# Patient Record
Sex: MALE | HISPANIC OR LATINO | URBAN - METROPOLITAN AREA
[De-identification: names, ages, dates, MRNs, and addresses within clinical notes are randomized per-mention and may not be internally consistent; named-entity substitution may affect disease eponyms.]

---

## 2017-07-08 ENCOUNTER — EMERGENCY (EMERGENCY)
Facility: HOSPITAL | Age: 57
LOS: 0 days | Discharge: HOME | End: 2017-07-08
Attending: EMERGENCY MEDICINE | Admitting: EMERGENCY MEDICINE
Payer: OTHER MISCELLANEOUS

## 2017-07-08 VITALS
SYSTOLIC BLOOD PRESSURE: 128 MMHG | TEMPERATURE: 98 F | HEART RATE: 83 BPM | DIASTOLIC BLOOD PRESSURE: 69 MMHG | OXYGEN SATURATION: 97 % | RESPIRATION RATE: 18 BRPM

## 2017-07-08 VITALS — HEIGHT: 67 IN | WEIGHT: 199.96 LBS

## 2017-07-08 DIAGNOSIS — E11.9 TYPE 2 DIABETES MELLITUS WITHOUT COMPLICATIONS: ICD-10-CM

## 2017-07-08 DIAGNOSIS — F41.9 ANXIETY DISORDER, UNSPECIFIED: ICD-10-CM

## 2017-07-08 DIAGNOSIS — S61.219A LACERATION WITHOUT FOREIGN BODY OF UNSPECIFIED FINGER WITHOUT DAMAGE TO NAIL, INITIAL ENCOUNTER: ICD-10-CM
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
## 2017-07-08 DIAGNOSIS — S61.012A LACERATION WITHOUT FOREIGN BODY OF LEFT THUMB WITHOUT DAMAGE TO NAIL, INITIAL ENCOUNTER: ICD-10-CM

## 2017-07-08 PROCEDURE — 73140 X-RAY EXAM OF FINGER(S): CPT | Mod: 26,LT

## 2017-07-08 PROCEDURE — 99284 EMERGENCY DEPT VISIT MOD MDM: CPT

## 2017-07-08 RX ORDER — CEFAZOLIN SODIUM 1 G
1000 VIAL (EA) INJECTION ONCE
Refills: 0 | Status: COMPLETED | OUTPATIENT
Start: 2017-07-08 | End: 2017-07-08

## 2017-07-08 RX ORDER — TETANUS TOXOID, REDUCED DIPHTHERIA TOXOID AND ACELLULAR PERTUSSIS VACCINE, ADSORBED 5; 2.5; 8; 8; 2.5 [IU]/.5ML; [IU]/.5ML; UG/.5ML; UG/.5ML; UG/.5ML
0.5 SUSPENSION INTRAMUSCULAR ONCE
Refills: 0 | Status: COMPLETED | OUTPATIENT
Start: 2017-07-08 | End: 2017-07-08

## 2017-07-08 RX ORDER — CEPHALEXIN 500 MG
1 CAPSULE ORAL
Qty: 28 | Refills: 0
Start: 2017-07-08 | End: 2017-07-15

## 2017-07-08 RX ADMIN — Medication 100 MILLIGRAM(S): at 15:32

## 2017-07-08 RX ADMIN — TETANUS TOXOID, REDUCED DIPHTHERIA TOXOID AND ACELLULAR PERTUSSIS VACCINE, ADSORBED 0.5 MILLILITER(S): 5; 2.5; 8; 8; 2.5 SUSPENSION INTRAMUSCULAR at 15:33

## 2017-07-08 NOTE — ED STATDOCS - ATTENDING CONTRIBUTION TO CARE
I, Maykel Ochoa DO,  performed the initial face to face bedside interview with this patient regarding history of present illness, review of symptoms and relevant past medical, social and family history.  I completed an independent physical examination.  I was the initial provider who evaluated this patient. I have signed out the follow up of any pending tests (i.e. labs, radiological studies) to the ACP.  I have communicated the patient’s plan of care and disposition with the ACP.  The history, relevant review of systems, past medical and surgical history, medical decision making, and physical examination was documented by the scribe in my presence and I attest to the accuracy of the documentation.

## 2017-07-08 NOTE — ED STATDOCS - OBJECTIVE STATEMENT
58 y/o M with no pMHx presents to the ED c/o L thumb pain secondary to cutting finger while at work today. Pt states that he was cutting metal and cut his thumb, currently calm, able to give somewhat adequate hx and denies HA, fever, cough, chills or any other acute c/o at this time.

## 2017-07-08 NOTE — CONSULT NOTE ADULT - SUBJECTIVE AND OBJECTIVE BOX
57M presents to ED c/o of left thumb laceration. Patient was fixing a pool ladder when his hand slipped was cut. Denies numbness, tingling, paresthesias. He denies any other injuries to any other extremity. Has no other complaints.    PMH:  No pertinent past medical history    PSH:  Denies    All: NKDA    Meds: See med rec    Vital Signs Last 24 Hrs  T(C): 36.9 (08 Jul 2017 14:25), Max: 36.9 (08 Jul 2017 14:25)  T(F): 98.4 (08 Jul 2017 14:25), Max: 98.4 (08 Jul 2017 14:25)  HR: 83 (08 Jul 2017 14:25) (83 - 83)  BP: 128/69 (08 Jul 2017 14:25) (128/69 - 128/69)  BP(mean): --  RR: 18 (08 Jul 2017 14:25) (18 - 18)  SpO2: 97% (08 Jul 2017 14:25) (97% - 97%)    Imaging:  XR L Thumb: No fracture    Gen: NAD, A&Ox3  HEENT: NC/AT  LUE:  Left thumb: Laceration through nail and extends distally. Sensation intact throughout thumb. Intact flexion/extension of PIP/DIP joints, compartments soft, +cap refill    Procedure - digital block performed to L thumb with 22G needle and 1% lido without epi via sterile technique. Fingers prepped and draped with betadine. Wound thoroughly  irrigated/debrided. Nail removed to examine nail bed, which was not injured. Wound closed with interrupted 5.0 chromic sutures. Sterile dressings applied. Pt tolerated well. NV status unchanged post-procedure.       Assessment and Recommendation:   · Assessment		  57M with L thumb laceration  -Wounds irrigated/debrided, thumb dressed  -pain control  -keep dressing clean/dry/intact  -rest/elevate  -recc no return to work, and avoidance of any strenuous physical activity  -recc keflex x5 days  -pt instructed to f/u with Dr Hurst this week, call office on Monday to make an appt  -Discussed with Dr Hurst/aware of plan  -pt ortho stable for d/c

## 2017-07-08 NOTE — ED STATDOCS - MEDICAL DECISION MAKING DETAILS
56 y/o M c/o L thumb lac secondary to cutting metal with plans to receive wound care, abx, xray imaging, tetanus for further eval and tx.

## 2017-10-11 ENCOUNTER — EMERGENCY (EMERGENCY)
Facility: HOSPITAL | Age: 57
LOS: 0 days | Discharge: HOME | End: 2017-10-11
Attending: EMERGENCY MEDICINE | Admitting: EMERGENCY MEDICINE
Payer: COMMERCIAL

## 2017-10-11 VITALS — WEIGHT: 229.94 LBS | HEIGHT: 67 IN

## 2017-10-11 VITALS
RESPIRATION RATE: 16 BRPM | HEART RATE: 100 BPM | OXYGEN SATURATION: 98 % | SYSTOLIC BLOOD PRESSURE: 148 MMHG | DIASTOLIC BLOOD PRESSURE: 88 MMHG | TEMPERATURE: 99 F

## 2017-10-11 DIAGNOSIS — R10.30 LOWER ABDOMINAL PAIN, UNSPECIFIED: ICD-10-CM

## 2017-10-11 DIAGNOSIS — N48.1 BALANITIS: ICD-10-CM

## 2017-10-11 LAB — GLUCOSE BLDC GLUCOMTR-MCNC: 366 MG/DL — HIGH (ref 70–99)

## 2017-10-11 PROCEDURE — 99284 EMERGENCY DEPT VISIT MOD MDM: CPT

## 2017-10-11 RX ORDER — CLOTRIMAZOLE AND BETAMETHASONE DIPROPIONATE 10; .5 MG/G; MG/G
1 CREAM TOPICAL ONCE
Refills: 0 | Status: COMPLETED | OUTPATIENT
Start: 2017-10-11 | End: 2017-10-11

## 2017-10-11 RX ORDER — CEPHALEXIN 500 MG
500 CAPSULE ORAL ONCE
Refills: 0 | Status: COMPLETED | OUTPATIENT
Start: 2017-10-11 | End: 2017-10-11

## 2017-10-11 RX ORDER — CEPHALEXIN 500 MG
1 CAPSULE ORAL
Qty: 28 | Refills: 0
Start: 2017-10-11 | End: 2017-10-18

## 2017-10-11 RX ADMIN — Medication 500 MILLIGRAM(S): at 20:02

## 2017-10-11 RX ADMIN — CLOTRIMAZOLE AND BETAMETHASONE DIPROPIONATE 1 APPLICATION(S): 10; .5 CREAM TOPICAL at 20:02

## 2017-10-11 NOTE — ED STATDOCS - SKIN, MLM
(+) Uncircumcised penis, able to retract foreskin, but under foreskin it is red and painful.  skin normal color for race, warm, dry and intact.

## 2017-10-11 NOTE — ED STATDOCS - ATTENDING CONTRIBUTION TO CARE
I, Coy López, performed the initial face to face bedside interview with this patient regarding history of present illness, review of symptoms and relevant past medical, social and family history.  I completed an independent physical examination.  I was the initial provider who evaluated this patient. I have signed out the follow up of any pending tests (i.e. labs, radiological studies) to the ACP.  I have communicated the patient’s plan of care and disposition with the ACP.  The history, relevant review of systems, past medical and surgical history, medical decision making, and physical examination was documented by the scribe in my presence and I attest to the accuracy of the documentation.

## 2017-10-11 NOTE — ED STATDOCS - PROGRESS NOTE DETAILS
pt seen with attending in intake fro pain and reddens to the tip and surrounding penile head for 1 month that has waxed and weaned to more severe  Has not been to PMD denies penile discharge or hx of STD's  tip of penis shows balanitis, redness and swelling to the uncircumcised penis that is easily retracted and showing some white  material patients ; discussed with patient he likely has diabetes; -- patient to follow up with Dr. Barreto tomorrow    -md stefano

## 2017-10-11 NOTE — ED STATDOCS - OBJECTIVE STATEMENT
58 y/o male presents to the ED c/o penis pain.  Uncircumcised, having pain with urination.  Had redness around penis 1 month ago.  He sprayed lemon juice on penis to try to alleviate pain to penis but it caused more pain.  PCP Dr. Barreto.

## 2018-03-09 ENCOUNTER — EMERGENCY (EMERGENCY)
Facility: HOSPITAL | Age: 58
LOS: 0 days | Discharge: HOME | End: 2018-03-09
Attending: EMERGENCY MEDICINE | Admitting: EMERGENCY MEDICINE
Payer: COMMERCIAL

## 2018-03-09 VITALS
OXYGEN SATURATION: 100 % | TEMPERATURE: 98 F | RESPIRATION RATE: 18 BRPM | HEART RATE: 75 BPM | DIASTOLIC BLOOD PRESSURE: 70 MMHG | SYSTOLIC BLOOD PRESSURE: 138 MMHG

## 2018-03-09 VITALS — HEIGHT: 67 IN | WEIGHT: 240.08 LBS

## 2018-03-09 DIAGNOSIS — M65.271 CALCIFIC TENDINITIS, RIGHT ANKLE AND FOOT: ICD-10-CM

## 2018-03-09 DIAGNOSIS — M79.671 PAIN IN RIGHT FOOT: ICD-10-CM

## 2018-03-09 PROCEDURE — 73630 X-RAY EXAM OF FOOT: CPT | Mod: 26,LT

## 2018-03-09 PROCEDURE — 99283 EMERGENCY DEPT VISIT LOW MDM: CPT

## 2018-03-09 PROCEDURE — 73650 X-RAY EXAM OF HEEL: CPT | Mod: 26,RT

## 2018-03-09 RX ORDER — OXYCODONE HYDROCHLORIDE 5 MG/1
1 TABLET ORAL
Qty: 10 | Refills: 0
Start: 2018-03-09

## 2018-03-09 RX ORDER — OXYCODONE HYDROCHLORIDE 5 MG/1
5 TABLET ORAL ONCE
Refills: 0 | Status: DISCONTINUED | OUTPATIENT
Start: 2018-03-09 | End: 2018-03-09

## 2018-03-09 RX ORDER — IBUPROFEN 200 MG
600 TABLET ORAL ONCE
Refills: 0 | Status: COMPLETED | OUTPATIENT
Start: 2018-03-09 | End: 2018-03-09

## 2018-03-09 RX ORDER — IBUPROFEN 200 MG
1 TABLET ORAL
Qty: 15 | Refills: 0
Start: 2018-03-09

## 2018-03-09 RX ADMIN — Medication 600 MILLIGRAM(S): at 21:09

## 2018-03-09 RX ADMIN — OXYCODONE HYDROCHLORIDE 5 MILLIGRAM(S): 5 TABLET ORAL at 23:22

## 2018-03-09 NOTE — ED ADULT TRIAGE NOTE - CHIEF COMPLAINT QUOTE
Pt presents to the ED c/o right foot pain and inflammation from arch to heel area. Pt states he is a  and approximately one month ago began getting a sharp stabbing pain in foot. Today pt tripped over a rolled up rug and states just the sensation of having foot touch area caused immense pain.

## 2018-03-09 NOTE — ED STATDOCS - PROGRESS NOTE DETAILS
signed Yesica Duncan PA-C Pt seen in intake initially by Dr Francois.   Pt c/o right plantar heel pain. +osteophytes and calcific deposits in plantar fascia on xray. Likely source of pts pain. recommend outpt f/u podiatry. rx motrin and oxycodone. WBAT. Pt declines  services. Pt delcines crutches. Pt feeling well, pt and family agree with DC and plan of care.

## 2018-03-09 NOTE — ED STATDOCS - OBJECTIVE STATEMENT
58 y/o male with a PMHx of Type II DM, anxiety presents to the ED c/o right foot pain and inflammation from arch to heel area at 8am this morning. Pt states he is a  and approximately one month ago began getting a sharp stabbing pain in foot. Today pt tripped over a rolled up rug and states just the sensation of having foot touch area caused immense pain. Patient states he has hurt the same foot prior. Last dosage of pain medication was this morning.

## 2018-03-09 NOTE — ED STATDOCS - ATTENDING CONTRIBUTION TO CARE
Attending Contribution to Care: I, Malina Francois, performed the initial face to face bedside interview with this patient regarding history of present illness, review of symptoms and relevant past medical, social and family history.  I completed an independent physical examination.  I was the initial provider who evaluated this patient. I have signed out the follow up of any pending tests (i.e. labs, radiological studies) to the ACP.  I have communicated the patient’s plan of care and disposition with the ACP.

## 2018-03-09 NOTE — ED STATDOCS - MUSCULOSKELETAL, MLM
+ point tenderness to the medial side of the calcaneus of the R foot. range of motion is not limited and there is no muscle tenderness.

## 2018-07-13 ENCOUNTER — APPOINTMENT (OUTPATIENT)
Dept: UROLOGY | Facility: CLINIC | Age: 58
End: 2018-07-13
Payer: COMMERCIAL

## 2018-07-13 VITALS
SYSTOLIC BLOOD PRESSURE: 139 MMHG | OXYGEN SATURATION: 97 % | BODY MASS INDEX: 37.35 KG/M2 | TEMPERATURE: 98 F | HEIGHT: 67 IN | WEIGHT: 238 LBS | HEART RATE: 75 BPM | DIASTOLIC BLOOD PRESSURE: 75 MMHG

## 2018-07-13 PROCEDURE — 99244 OFF/OP CNSLTJ NEW/EST MOD 40: CPT

## 2018-07-13 RX ORDER — CLOTRIMAZOLE AND BETAMETHASONE DIPROPIONATE 10; .5 MG/G; MG/G
1-0.05 CREAM TOPICAL 3 TIMES DAILY
Qty: 15 | Refills: 2 | Status: ACTIVE | COMMUNITY
Start: 2018-07-13 | End: 1900-01-01

## 2018-07-20 ENCOUNTER — APPOINTMENT (OUTPATIENT)
Dept: UROLOGY | Facility: CLINIC | Age: 58
End: 2018-07-20
Payer: COMMERCIAL

## 2018-07-20 PROCEDURE — 99213 OFFICE O/P EST LOW 20 MIN: CPT

## 2018-08-15 ENCOUNTER — OTHER (OUTPATIENT)
Age: 58
End: 2018-08-15

## 2018-08-15 ENCOUNTER — APPOINTMENT (OUTPATIENT)
Dept: UROLOGY | Facility: HOSPITAL | Age: 58
End: 2018-08-15

## 2018-12-14 ENCOUNTER — APPOINTMENT (OUTPATIENT)
Dept: NEUROLOGY | Facility: CLINIC | Age: 58
End: 2018-12-14
Payer: COMMERCIAL

## 2018-12-14 VITALS
BODY MASS INDEX: 37.04 KG/M2 | SYSTOLIC BLOOD PRESSURE: 150 MMHG | HEIGHT: 67 IN | DIASTOLIC BLOOD PRESSURE: 78 MMHG | WEIGHT: 236 LBS

## 2018-12-14 DIAGNOSIS — Z83.3 FAMILY HISTORY OF DIABETES MELLITUS: ICD-10-CM

## 2018-12-14 DIAGNOSIS — Z82.49 FAMILY HISTORY OF ISCHEMIC HEART DISEASE AND OTHER DISEASES OF THE CIRCULATORY SYSTEM: ICD-10-CM

## 2018-12-14 PROCEDURE — 99213 OFFICE O/P EST LOW 20 MIN: CPT

## 2018-12-14 RX ORDER — SITAGLIPTIN AND METFORMIN HYDROCHLORIDE 50; 1000 MG/1; MG/1
50-1000 TABLET, FILM COATED ORAL
Qty: 180 | Refills: 0 | Status: ACTIVE | COMMUNITY
Start: 2018-06-26

## 2018-12-14 RX ORDER — LISINOPRIL 2.5 MG/1
2.5 TABLET ORAL
Qty: 30 | Refills: 0 | Status: ACTIVE | COMMUNITY
Start: 2018-07-10

## 2018-12-14 RX ORDER — INSULIN GLARGINE 100 [IU]/ML
100 INJECTION, SOLUTION SUBCUTANEOUS
Qty: 9 | Refills: 0 | Status: ACTIVE | COMMUNITY
Start: 2018-07-10

## 2019-02-12 ENCOUNTER — APPOINTMENT (OUTPATIENT)
Age: 59
End: 2019-02-12
Payer: COMMERCIAL

## 2019-02-12 VITALS
WEIGHT: 218 LBS | BODY MASS INDEX: 34.21 KG/M2 | HEART RATE: 95 BPM | HEIGHT: 67 IN | SYSTOLIC BLOOD PRESSURE: 163 MMHG | TEMPERATURE: 98.3 F | OXYGEN SATURATION: 95 % | DIASTOLIC BLOOD PRESSURE: 95 MMHG

## 2019-02-12 DIAGNOSIS — N48.1 BALANITIS: ICD-10-CM

## 2019-02-12 PROCEDURE — 99213 OFFICE O/P EST LOW 20 MIN: CPT

## 2019-02-12 NOTE — PHYSICAL EXAM
[General Appearance - Well Developed] : well developed [General Appearance - Well Nourished] : well nourished [Normal Appearance] : normal appearance [Well Groomed] : well groomed [General Appearance - In No Acute Distress] : no acute distress [Abdomen Soft] : soft [Abdomen Tenderness] : non-tender [Costovertebral Angle Tenderness] : no ~M costovertebral angle tenderness [Urethral Meatus] : meatus normal [Urinary Bladder Findings] : the bladder was normal on palpation [Scrotum] : the scrotum was normal [Testes Mass (___cm)] : there were no testicular masses [No Prostate Nodules] : no prostate nodules [FreeTextEntry1] : Phimosis with balanitis, foreskin not retractable [Edema] : no peripheral edema [] : no respiratory distress [Respiration, Rhythm And Depth] : normal respiratory rhythm and effort [Exaggerated Use Of Accessory Muscles For Inspiration] : no accessory muscle use [Oriented To Time, Place, And Person] : oriented to person, place, and time [Affect] : the affect was normal [Mood] : the mood was normal [Not Anxious] : not anxious [Normal Station and Gait] : the gait and station were normal for the patient's age [No Focal Deficits] : no focal deficits [No Palpable Adenopathy] : no palpable adenopathy

## 2019-02-12 NOTE — END OF VISIT
[FreeTextEntry3] : He wishes to have a circumcision and hold the pros cons and possible complications of this procedure were gone over in detail with him. I explained to him that one of my associates may well be doing the procedure based upon timing and he agreed with this. Visually labs were ordered as well he'll followup in one year or as needed

## 2019-02-14 DIAGNOSIS — N47.1 PHIMOSIS: ICD-10-CM

## 2019-02-19 ENCOUNTER — APPOINTMENT (OUTPATIENT)
Dept: UROLOGY | Facility: HOSPITAL | Age: 59
End: 2019-02-19

## 2019-02-19 ENCOUNTER — OTHER (OUTPATIENT)
Age: 59
End: 2019-02-19

## 2019-02-19 DIAGNOSIS — N39.0 URINARY TRACT INFECTION, SITE NOT SPECIFIED: ICD-10-CM

## 2019-02-21 PROBLEM — N39.0 PYURIA: Status: ACTIVE | Noted: 2019-02-21

## 2019-02-21 LAB
APPEARANCE: CLEAR
BACTERIA: NEGATIVE
BILIRUBIN URINE: NEGATIVE
BLOOD URINE: NORMAL
COLOR: YELLOW
GLUCOSE QUALITATIVE U: ABNORMAL
HYALINE CASTS: 4 /LPF
KETONES URINE: NEGATIVE
LEUKOCYTE ESTERASE URINE: NEGATIVE
MICROSCOPIC-UA: NORMAL
NITRITE URINE: NEGATIVE
PH URINE: 6
PROTEIN URINE: ABNORMAL
PSA SERPL-MCNC: 1.01 NG/ML
RED BLOOD CELLS URINE: 4 /HPF
SPECIFIC GRAVITY URINE: 1.04
SQUAMOUS EPITHELIAL CELLS: 4 /HPF
UROBILINOGEN URINE: NORMAL
WHITE BLOOD CELLS URINE: 18 /HPF

## 2019-03-12 ENCOUNTER — OUTPATIENT (OUTPATIENT)
Dept: OUTPATIENT SERVICES | Facility: HOSPITAL | Age: 59
LOS: 1 days | Discharge: HOME | End: 2019-03-12
Payer: COMMERCIAL

## 2019-03-12 VITALS
OXYGEN SATURATION: 96 % | TEMPERATURE: 98 F | SYSTOLIC BLOOD PRESSURE: 136 MMHG | WEIGHT: 221.56 LBS | HEIGHT: 66.5 IN | DIASTOLIC BLOOD PRESSURE: 74 MMHG | HEART RATE: 75 BPM | RESPIRATION RATE: 16 BRPM

## 2019-03-12 DIAGNOSIS — N47.1 PHIMOSIS: ICD-10-CM

## 2019-03-12 DIAGNOSIS — Z01.818 ENCOUNTER FOR OTHER PREPROCEDURAL EXAMINATION: ICD-10-CM

## 2019-03-12 DIAGNOSIS — N48.1 BALANITIS: ICD-10-CM

## 2019-03-12 DIAGNOSIS — Z41.9 ENCOUNTER FOR PROCEDURE FOR PURPOSES OTHER THAN REMEDYING HEALTH STATE, UNSPECIFIED: Chronic | ICD-10-CM

## 2019-03-12 LAB
ANION GAP SERPL CALC-SCNC: 9 MMOL/L — SIGNIFICANT CHANGE UP (ref 5–17)
APPEARANCE UR: CLEAR — SIGNIFICANT CHANGE UP
APTT BLD: 29.4 SEC — SIGNIFICANT CHANGE UP (ref 27.5–36.3)
BACTERIA # UR AUTO: ABNORMAL
BASOPHILS # BLD AUTO: 0.05 K/UL — SIGNIFICANT CHANGE UP (ref 0–0.2)
BASOPHILS NFR BLD AUTO: 0.6 % — SIGNIFICANT CHANGE UP (ref 0–2)
BILIRUB UR-MCNC: NEGATIVE — SIGNIFICANT CHANGE UP
BUN SERPL-MCNC: 13 MG/DL — SIGNIFICANT CHANGE UP (ref 7–23)
CALCIUM SERPL-MCNC: 8.9 MG/DL — SIGNIFICANT CHANGE UP (ref 8.5–10.1)
CHLORIDE SERPL-SCNC: 101 MMOL/L — SIGNIFICANT CHANGE UP (ref 96–108)
CO2 SERPL-SCNC: 27 MMOL/L — SIGNIFICANT CHANGE UP (ref 22–31)
COLOR SPEC: YELLOW — SIGNIFICANT CHANGE UP
CREAT SERPL-MCNC: 1.26 MG/DL — SIGNIFICANT CHANGE UP (ref 0.5–1.3)
DIFF PNL FLD: NEGATIVE — SIGNIFICANT CHANGE UP
EOSINOPHIL # BLD AUTO: 0.12 K/UL — SIGNIFICANT CHANGE UP (ref 0–0.5)
EOSINOPHIL NFR BLD AUTO: 1.5 % — SIGNIFICANT CHANGE UP (ref 0–6)
EPI CELLS # UR: SIGNIFICANT CHANGE UP
GLUCOSE SERPL-MCNC: 399 MG/DL — HIGH (ref 70–99)
GLUCOSE UR QL: 1000 MG/DL
HCT VFR BLD CALC: 44.7 % — SIGNIFICANT CHANGE UP (ref 39–50)
HGB BLD-MCNC: 14.7 G/DL — SIGNIFICANT CHANGE UP (ref 13–17)
IMM GRANULOCYTES NFR BLD AUTO: 0.3 % — SIGNIFICANT CHANGE UP (ref 0–1.5)
INR BLD: 1.03 RATIO — SIGNIFICANT CHANGE UP (ref 0.88–1.16)
KETONES UR-MCNC: NEGATIVE — SIGNIFICANT CHANGE UP
LEUKOCYTE ESTERASE UR-ACNC: NEGATIVE — SIGNIFICANT CHANGE UP
LYMPHOCYTES # BLD AUTO: 2.64 K/UL — SIGNIFICANT CHANGE UP (ref 1–3.3)
LYMPHOCYTES # BLD AUTO: 33.6 % — SIGNIFICANT CHANGE UP (ref 13–44)
MCHC RBC-ENTMCNC: 27.3 PG — SIGNIFICANT CHANGE UP (ref 27–34)
MCHC RBC-ENTMCNC: 32.9 GM/DL — SIGNIFICANT CHANGE UP (ref 32–36)
MCV RBC AUTO: 82.9 FL — SIGNIFICANT CHANGE UP (ref 80–100)
MONOCYTES # BLD AUTO: 0.6 K/UL — SIGNIFICANT CHANGE UP (ref 0–0.9)
MONOCYTES NFR BLD AUTO: 7.6 % — SIGNIFICANT CHANGE UP (ref 2–14)
NEUTROPHILS # BLD AUTO: 4.43 K/UL — SIGNIFICANT CHANGE UP (ref 1.8–7.4)
NEUTROPHILS NFR BLD AUTO: 56.4 % — SIGNIFICANT CHANGE UP (ref 43–77)
NITRITE UR-MCNC: NEGATIVE — SIGNIFICANT CHANGE UP
NRBC # BLD: 0 /100 WBCS — SIGNIFICANT CHANGE UP (ref 0–0)
PH UR: 5 — SIGNIFICANT CHANGE UP (ref 5–8)
PLATELET # BLD AUTO: 210 K/UL — SIGNIFICANT CHANGE UP (ref 150–400)
POTASSIUM SERPL-MCNC: 4.2 MMOL/L — SIGNIFICANT CHANGE UP (ref 3.5–5.3)
POTASSIUM SERPL-SCNC: 4.2 MMOL/L — SIGNIFICANT CHANGE UP (ref 3.5–5.3)
PROT UR-MCNC: 100 MG/DL
PROTHROM AB SERPL-ACNC: 11.4 SEC — SIGNIFICANT CHANGE UP (ref 10–12.9)
RBC # BLD: 5.39 M/UL — SIGNIFICANT CHANGE UP (ref 4.2–5.8)
RBC # FLD: 13.1 % — SIGNIFICANT CHANGE UP (ref 10.3–14.5)
RBC CASTS # UR COMP ASSIST: ABNORMAL /HPF (ref 0–4)
SODIUM SERPL-SCNC: 137 MMOL/L — SIGNIFICANT CHANGE UP (ref 135–145)
SP GR SPEC: 1.01 — SIGNIFICANT CHANGE UP (ref 1.01–1.02)
UROBILINOGEN FLD QL: NEGATIVE MG/DL — SIGNIFICANT CHANGE UP
WBC # BLD: 7.86 K/UL — SIGNIFICANT CHANGE UP (ref 3.8–10.5)
WBC # FLD AUTO: 7.86 K/UL — SIGNIFICANT CHANGE UP (ref 3.8–10.5)
WBC UR QL: SIGNIFICANT CHANGE UP

## 2019-03-12 PROCEDURE — 93010 ELECTROCARDIOGRAM REPORT: CPT

## 2019-03-12 NOTE — H&P PST ADULT - HISTORY OF PRESENT ILLNESS
This is a 59 y/o Thai speaking male with a PMH of diabetes and seizures (last seizure was 9 years ago. Is followed by Dr. Marty Marie (neurologist)). [employer]  [id number] used for this interview. Patient reports he is unable to pull the foreskin of the penis back and he ends up with swelling. He is now scheduled for a circumcision. He denies any dysuria, frequency or nocturia.

## 2019-03-12 NOTE — H&P PST ADULT - ASSESSMENT
This is a 57 y/o Norwegian male with phimosis who is now scheduled for a circumcision    Patient instructed on     1. NPO post midnight of surgery  2. Aware that he needs medical clearance (has an appointment with Dr. Barreto 3/13/19)  3. Aware not to take any medicine on morning of procedure

## 2019-03-12 NOTE — H&P PST ADULT - NSICDXPASTMEDICALHX_GEN_ALL_CORE_FT
PAST MEDICAL HISTORY:  Arthritis B/L knee pain in cold weather    Diabetes     Seizures Last seizure was over 9 years ago

## 2019-03-13 LAB
CULTURE RESULTS: SIGNIFICANT CHANGE UP
SPECIMEN SOURCE: SIGNIFICANT CHANGE UP

## 2019-03-19 ENCOUNTER — APPOINTMENT (OUTPATIENT)
Dept: UROLOGY | Facility: HOSPITAL | Age: 59
End: 2019-03-19

## 2019-06-09 PROBLEM — N47.1 PHIMOSIS: Status: ACTIVE | Noted: 2018-07-13

## 2019-06-19 ENCOUNTER — APPOINTMENT (OUTPATIENT)
Dept: NEUROLOGY | Facility: CLINIC | Age: 59
End: 2019-06-19
Payer: COMMERCIAL

## 2019-06-19 VITALS
HEART RATE: 94 BPM | SYSTOLIC BLOOD PRESSURE: 159 MMHG | HEIGHT: 67 IN | WEIGHT: 225 LBS | BODY MASS INDEX: 35.31 KG/M2 | DIASTOLIC BLOOD PRESSURE: 90 MMHG

## 2019-06-19 PROCEDURE — 99214 OFFICE O/P EST MOD 30 MIN: CPT

## 2019-06-19 NOTE — DISCUSSION/SUMMARY
[Well-controlled] : well-controlled [FreeTextEntry1] : 59-year-old male with history of alcohol abuse, seizure disorder, none for the last 4 years.\par review present treatment.\par Plan: Obtain Keppra serum level.\par Electroencephalograph.\par renew Keppra XR. , 750 mg daily\par RTO 2 months [Generalized] : generalized  [Idiopathic] : idiopathic  [Risk of Death] : Risk of death associated with seizures / SUDEP was discussed. [Risks Associated with Driving/[place] Law] : As per my usual protocol, the patient was advised in regards to risks and driving privileges associated with the [place] Guidelines.  [Compliance with Medications] : The importance of compliance with medications was reinforced.

## 2019-06-19 NOTE — HISTORY OF PRESENT ILLNESS
[FreeTextEntry1] : 59 year old man hx of seizure, continues on Keppra  mg daily. No reported seizures. Last was in 2015. Main causes have been alcohol ingestion, non compliance.\par Previous evaluations have been negative.

## 2019-06-19 NOTE — DATA REVIEWED
[de-identified] : \par COMPARISON: CT head April 9, 2015\par  \par FINDINGS:  \par  \par The ventricles, sulci and basal cisterns appear unremarkable. There is\par no evidence of acute infarct, hemorrhage, or mass lesion.  There is an\par 8mm T2 hyperintensity within the right anterior frontal deep white\par matter and a 7 mm T2 hyperintensity in the left anterior frontal\par subcortical white matter. There is no evidence of abnormal\par enhancement. There is no evidence of midline shift or herniation\par pattern. No mass effect is found in the brain.  \par  \par The vertebral and internal carotid arteries demonstrate expected flow\par voids indicating their patency.  \par  \par The orbits are unremarkable.  The paranasal sinuses are clear.  The\par nasal cavity appears intact.  The nasopharynx is symmetric.  The\par central skull base and petrous temporal bones are intact.  The\par calvarium appears unremarkable.\par  \par  \par IMPRESSION:  No acute infarct, hemorrhage, or mass lesion. No abnormal\par enhancement.   8mm T2 hyperintensity within the right anterior frontal\par deep white matter and a 7 mm T2 hyperintensity in the left anterior\par frontal subcortical white matter, of unknown clinical significance ,\par differential includes foci of traumatic gliosis, chronic microvascular\par ischemic change, demyelination, among other white matter etiologies.

## 2019-06-19 NOTE — PHYSICAL EXAM
[General Appearance - In No Acute Distress] : in no acute distress [General Appearance - Alert] : alert [Time] : oriented to time [Person] : oriented to person [Place] : oriented to place [Concentration Intact] : normal concentrating ability [Visual Intact] : visual attention was ~T not ~L decreased [Writing A Sentence] : no difficulty writing a sentence [Fluency] : fluency intact [Reading] : reading intact [Comprehension] : comprehension intact [Past History] : adequate knowledge of personal past history [Cranial Nerves Optic (II)] : visual acuity intact bilaterally,  visual fields full to confrontation, pupils equal round and reactive to light [Cranial Nerves Oculomotor (III)] : extraocular motion intact [Cranial Nerves Trigeminal (V)] : facial sensation intact symmetrically [Cranial Nerves Vestibulocochlear (VIII)] : hearing was intact bilaterally [Cranial Nerves Glossopharyngeal (IX)] : tongue and palate midline [Cranial Nerves Facial (VII)] : face symmetrical [Motor Tone] : muscle tone was normal in all four extremities [Cranial Nerves Accessory (XI - Cranial And Spinal)] : head turning and shoulder shrug symmetric [Cranial Nerves Hypoglossal (XII)] : there was no tongue deviation with protrusion [Motor Strength] : muscle strength was normal in all four extremities [No Muscle Atrophy] : normal bulk in all four extremities [Balance] : balance was intact [Abnormal Walk] : normal gait [Sensation Tactile Decrease] : light touch was intact [Tremor] : no tremor present [Past-pointing] : there was no past-pointing [Plantar Reflex Right Only] : normal on the right [2+] : Ankle jerk left 2+ [Plantar Reflex Left Only] : normal on the left

## 2019-06-21 ENCOUNTER — APPOINTMENT (OUTPATIENT)
Dept: NEUROLOGY | Facility: CLINIC | Age: 59
End: 2019-06-21
Payer: COMMERCIAL

## 2019-06-21 PROCEDURE — 95819 EEG AWAKE AND ASLEEP: CPT

## 2019-08-02 LAB — LEVETIRACETAM SERPL-MCNC: 9.1 MCG/ML

## 2019-08-19 ENCOUNTER — APPOINTMENT (OUTPATIENT)
Dept: NEUROLOGY | Facility: CLINIC | Age: 59
End: 2019-08-19
Payer: COMMERCIAL

## 2019-08-19 VITALS
DIASTOLIC BLOOD PRESSURE: 74 MMHG | WEIGHT: 222 LBS | HEIGHT: 67 IN | HEART RATE: 81 BPM | SYSTOLIC BLOOD PRESSURE: 149 MMHG | BODY MASS INDEX: 34.84 KG/M2

## 2019-08-19 PROCEDURE — 99213 OFFICE O/P EST LOW 20 MIN: CPT

## 2019-08-19 RX ORDER — LEVETIRACETAM 250 MG/1
250 TABLET, FILM COATED ORAL
Qty: 90 | Refills: 0 | Status: DISCONTINUED | COMMUNITY
Start: 2019-03-27 | End: 2019-08-19

## 2019-08-19 NOTE — HISTORY OF PRESENT ILLNESS
[FreeTextEntry1] : 59-year-old, right-handed man, history of epilepsy, presently on Keppra,  mg daily. Tolerating medication well, no reported seizures. Most recent EEG in June was normal. Seizure control much better since stopped alcohol consumption.\par

## 2019-08-19 NOTE — DATA REVIEWED
[de-identified] : June 21, 2019. Impression: Normal awake and drowsy. [de-identified] : Keppra serum level (8/2019) 9.1

## 2019-08-19 NOTE — PHYSICAL EXAM
[General Appearance - Alert] : alert [General Appearance - In No Acute Distress] : in no acute distress [Affect] : the affect was normal [Oriented To Time, Place, And Person] : oriented to person, place, and time [Impaired Insight] : insight and judgment were intact [Person] : oriented to person [Time] : oriented to time [Concentration Intact] : normal concentrating ability [Place] : oriented to place [Visual Intact] : visual attention was ~T not ~L decreased [Writing A Sentence] : no difficulty writing a sentence [Fluency] : fluency intact [Reading] : reading intact [Comprehension] : comprehension intact [Cranial Nerves Optic (II)] : visual acuity intact bilaterally,  visual fields full to confrontation, pupils equal round and reactive to light [Past History] : adequate knowledge of personal past history [Cranial Nerves Oculomotor (III)] : extraocular motion intact [Cranial Nerves Trigeminal (V)] : facial sensation intact symmetrically [Cranial Nerves Vestibulocochlear (VIII)] : hearing was intact bilaterally [Cranial Nerves Facial (VII)] : face symmetrical [Cranial Nerves Glossopharyngeal (IX)] : tongue and palate midline [Cranial Nerves Accessory (XI - Cranial And Spinal)] : head turning and shoulder shrug symmetric [Cranial Nerves Hypoglossal (XII)] : there was no tongue deviation with protrusion [No Muscle Atrophy] : normal bulk in all four extremities [Motor Tone] : muscle tone was normal in all four extremities [Motor Strength] : muscle strength was normal in all four extremities [Abnormal Walk] : normal gait [Sensation Tactile Decrease] : light touch was intact [Balance] : balance was intact [2+] : Ankle jerk right 2+ [Past-pointing] : there was no past-pointing [Tremor] : no tremor present [Plantar Reflex Right Only] : normal on the right [Plantar Reflex Left Only] : normal on the left

## 2019-08-19 NOTE — DISCUSSION/SUMMARY
[Well-controlled] : well-controlled [Safety Recommendations] : The patient was advised in regards to the risk of seizures and general seizure safety recommendations including not to be bathing alone, climbing to high places and operating heavy machinery. [Compliance with Medications] : The importance of compliance with medications was reinforced. [Medication Side Effects] : High frequency and serious potential medication adverse effects were reviewed with the patient, including but not exclusive to psychiatric effects.  Information sheets on medication side effects were made available to the patient in our clinic.  The patient or advocate agrees to notify us for any concerns. [FreeTextEntry1] : 59-year-old, right-handed male, history of epilepsy, Ling, seizures for the last 4 years. Tolerating Keppra  mg daily. Doing well.\par Plan: continue same dose keppra  mg daily.\par RTO 6 months.\par

## 2019-08-29 NOTE — ED ADULT NURSE NOTE - HARM RISK FACTORS
no 42 y/o F 12 weeks pregnant presents to ED for evaluation. As per patient, today was helping a patient place socks on her feet when she moved her knee. Pt reports that the knee hit her in the abdominal region.  Pt denies having any abdominal pain, fever, vaginal bleeding, vomiting, diarrhea, or other medical complaints. Pt was seen by OB-GYN on Tuesday who confirmed everything was normal with pregnancy (fetal HR was 146 bpm at this time). LMP .   PMH: HTN   PSH: S/P   x2  FH/SH: non-contributory, except as noted in the HPI  Allergies: No known drug allergies  Immunizations: Up-to-date 44 y/o F 12 weeks pregnant (LMP ) presents to ED for evaluation. As per patient, today was helping a patient place socks on her feet when she moved her knee. Pt reports that the knee hit her in the abdominal region.  Pt denies having any abdominal pain, fever, vaginal bleeding, vomiting, diarrhea, or other medical complaints. Pt came to ED for evaluation due to being pregnant but admits to having no acute symptoms.  Pt was seen by her OB-GYN on Tuesday who confirmed IUP and everything was "normal" with pregnancy (fetal HR was 146 bpm at this time). LMP .   PMH: HTN   PSH: S/P   x2  FH/SH: non-contributory, except as noted in the HPI  Allergies: No known drug allergies  Immunizations: Up-to-date

## 2020-01-30 NOTE — HISTORY OF PRESENT ILLNESS
[place] [place] [facility]  [address] [facility]  [address]  [phone] phone     [phone] fax  Hours of Operation: Monday- Friday 8:00am- 5:00pm  After hours phone  [phone]  Hematology / Oncology Physicians on call      [facility] [address] Dr., Dr., Dr., NP Sydney Prescott, NP Tyesha Taylor, NP    Please call with any concerns regarding your appointment today.   [FreeTextEntry1] : This patient presents for evaluation of phimosis which has worsened since last year. He had been scheduled for a circumcision but has family issues and was unable to make the date of the procedure. At this point he is fully unable to retract the skin. He has some occasional frequency and slowing of the stream

## 2020-02-18 ENCOUNTER — APPOINTMENT (OUTPATIENT)
Dept: NEUROLOGY | Facility: CLINIC | Age: 60
End: 2020-02-18

## 2020-03-18 DIAGNOSIS — N40.0 BENIGN PROSTATIC HYPERPLASIA WITHOUT LOWER URINARY TRACT SYMPMS: ICD-10-CM

## 2020-03-31 ENCOUNTER — INPATIENT (INPATIENT)
Facility: HOSPITAL | Age: 60
LOS: 4 days | Discharge: HOME | DRG: 177 | End: 2020-04-05
Attending: FAMILY MEDICINE | Admitting: HOSPITALIST
Payer: COMMERCIAL

## 2020-03-31 VITALS
SYSTOLIC BLOOD PRESSURE: 132 MMHG | OXYGEN SATURATION: 93 % | RESPIRATION RATE: 18 BRPM | HEART RATE: 101 BPM | DIASTOLIC BLOOD PRESSURE: 69 MMHG | HEIGHT: 66 IN | TEMPERATURE: 103 F | WEIGHT: 210.1 LBS

## 2020-03-31 DIAGNOSIS — Z41.9 ENCOUNTER FOR PROCEDURE FOR PURPOSES OTHER THAN REMEDYING HEALTH STATE, UNSPECIFIED: Chronic | ICD-10-CM

## 2020-03-31 LAB
BASOPHILS # BLD AUTO: 0.02 K/UL — SIGNIFICANT CHANGE UP (ref 0–0.2)
BASOPHILS NFR BLD AUTO: 0.3 % — SIGNIFICANT CHANGE UP (ref 0–2)
EOSINOPHIL # BLD AUTO: 0 K/UL — SIGNIFICANT CHANGE UP (ref 0–0.5)
EOSINOPHIL NFR BLD AUTO: 0 % — SIGNIFICANT CHANGE UP (ref 0–6)
HCT VFR BLD CALC: 45.9 % — SIGNIFICANT CHANGE UP (ref 39–50)
HGB BLD-MCNC: 15.6 G/DL — SIGNIFICANT CHANGE UP (ref 13–17)
IMM GRANULOCYTES NFR BLD AUTO: 0.3 % — SIGNIFICANT CHANGE UP (ref 0–1.5)
LYMPHOCYTES # BLD AUTO: 0.91 K/UL — LOW (ref 1–3.3)
LYMPHOCYTES # BLD AUTO: 13.2 % — SIGNIFICANT CHANGE UP (ref 13–44)
MCHC RBC-ENTMCNC: 27 PG — SIGNIFICANT CHANGE UP (ref 27–34)
MCHC RBC-ENTMCNC: 34 GM/DL — SIGNIFICANT CHANGE UP (ref 32–36)
MCV RBC AUTO: 79.4 FL — LOW (ref 80–100)
MONOCYTES # BLD AUTO: 0.59 K/UL — SIGNIFICANT CHANGE UP (ref 0–0.9)
MONOCYTES NFR BLD AUTO: 8.6 % — SIGNIFICANT CHANGE UP (ref 2–14)
NEUTROPHILS # BLD AUTO: 5.36 K/UL — SIGNIFICANT CHANGE UP (ref 1.8–7.4)
NEUTROPHILS NFR BLD AUTO: 77.6 % — HIGH (ref 43–77)
PLATELET # BLD AUTO: 200 K/UL — SIGNIFICANT CHANGE UP (ref 150–400)
RBC # BLD: 5.78 M/UL — SIGNIFICANT CHANGE UP (ref 4.2–5.8)
RBC # FLD: 12.8 % — SIGNIFICANT CHANGE UP (ref 10.3–14.5)
WBC # BLD: 6.9 K/UL — SIGNIFICANT CHANGE UP (ref 3.8–10.5)
WBC # FLD AUTO: 6.9 K/UL — SIGNIFICANT CHANGE UP (ref 3.8–10.5)

## 2020-03-31 PROCEDURE — 93010 ELECTROCARDIOGRAM REPORT: CPT

## 2020-03-31 PROCEDURE — 71045 X-RAY EXAM CHEST 1 VIEW: CPT | Mod: 26

## 2020-03-31 RX ORDER — ACETAMINOPHEN 500 MG
650 TABLET ORAL ONCE
Refills: 0 | Status: COMPLETED | OUTPATIENT
Start: 2020-03-31 | End: 2020-03-31

## 2020-03-31 RX ORDER — SODIUM CHLORIDE 9 MG/ML
2000 INJECTION INTRAMUSCULAR; INTRAVENOUS; SUBCUTANEOUS ONCE
Refills: 0 | Status: COMPLETED | OUTPATIENT
Start: 2020-03-31 | End: 2020-03-31

## 2020-03-31 RX ADMIN — SODIUM CHLORIDE 2000 MILLILITER(S): 9 INJECTION INTRAMUSCULAR; INTRAVENOUS; SUBCUTANEOUS at 23:42

## 2020-03-31 RX ADMIN — Medication 650 MILLIGRAM(S): at 23:42

## 2020-03-31 NOTE — ED ADULT TRIAGE NOTE - BP NONINVASIVE DIASTOLIC (MM HG)
AG Epclusa refill (3 of 6) confirmed and reassessment complete. We will ship AG Epclusa refill on 3/24 via fedex to arrive on 3/25. $0.00 copay- 004. Confirmed 2 patient identifiers - name and . Therapy appropriate.      Patient has 3 doses of AG Epclusa remaining and takes it around 9 AM daily. He is taking medications a the following times:  - Epclusa will be taken WITH BREAKFAST at 9 AM   - Omeprazole 20 mg will be taken 4 hours later at 1 pm (confirmed pick-up from [facility])  - Calcium carbonate will be taken at lunch and dinner      Pt reports they are not having any side effects so far. Patient reported missing 1 dose during this past month. Patient advised of medication adherence importance and decreased effectiveness of medication with missed doses. Patient advised to use phone alarm to aid in medication adherence. Patient verbalized understanding. No new medications, no new allergies or health conditions reported at this time. Allergies reviewed and medication reconciliation complete (reviewed and documented in [facility] and [facility]).  Disease education reviewed (including transmission and prevention). Patient counseled on importance of maintaining adherence and keeping lab appointments which were scheduled. Reviewed importance of returning calls from [facility] for refills. All questions answered and addressed to patients satisfaction. Advised to call [facility] and provider if any issues arise.  Pt verbalized understanding.   69

## 2020-03-31 NOTE — ED PROVIDER NOTE - OBJECTIVE STATEMENT
60 yo male with h/o seizure d/o, DM c/o 7 days of feeling unwell.  +fatigue, fevers/chills, decreased PO intake, sob, coughing.  Last took tylenol at midnight.  +diarrhea x 2 days.  No chest pain, abdominal pain, n/v.  No known sick contacts.  Nonsmoker.  Did not take his diabetes meds today.  Estevan is PMD

## 2020-03-31 NOTE — ED ADULT TRIAGE NOTE - CHIEF COMPLAINT QUOTE
Pt BIBEMS c/o SOB. Pt was seen at Urgent care today and was tested for COVID 19. C/O lethargy, SOB, and fevers x8days  HX DM

## 2020-03-31 NOTE — ED PROVIDER NOTE - CLINICAL SUMMARY MEDICAL DECISION MAKING FREE TEXT BOX
Multiple comlaints/symptoms, likely COVID19.  Will swab for COVID; labs, chest xray and tylenol.  Pt with co-morbidities and Pox low 90s.  Will monitor

## 2020-04-01 DIAGNOSIS — E87.1 HYPO-OSMOLALITY AND HYPONATREMIA: ICD-10-CM

## 2020-04-01 DIAGNOSIS — J12.9 VIRAL PNEUMONIA, UNSPECIFIED: ICD-10-CM

## 2020-04-01 DIAGNOSIS — R56.9 UNSPECIFIED CONVULSIONS: ICD-10-CM

## 2020-04-01 DIAGNOSIS — E11.9 TYPE 2 DIABETES MELLITUS WITHOUT COMPLICATIONS: ICD-10-CM

## 2020-04-01 DIAGNOSIS — N28.9 DISORDER OF KIDNEY AND URETER, UNSPECIFIED: ICD-10-CM

## 2020-04-01 LAB
ALBUMIN SERPL ELPH-MCNC: 2.2 G/DL — LOW (ref 3.3–5)
ALP SERPL-CCNC: 62 U/L — SIGNIFICANT CHANGE UP (ref 40–120)
ALT FLD-CCNC: 31 U/L — SIGNIFICANT CHANGE UP (ref 12–78)
ANION GAP SERPL CALC-SCNC: 10 MMOL/L — SIGNIFICANT CHANGE UP (ref 5–17)
AST SERPL-CCNC: 31 U/L — SIGNIFICANT CHANGE UP (ref 15–37)
BILIRUB SERPL-MCNC: 0.3 MG/DL — SIGNIFICANT CHANGE UP (ref 0.2–1.2)
BUN SERPL-MCNC: 39 MG/DL — HIGH (ref 7–23)
CALCIUM SERPL-MCNC: 8 MG/DL — LOW (ref 8.5–10.1)
CHLORIDE SERPL-SCNC: 97 MMOL/L — SIGNIFICANT CHANGE UP (ref 96–108)
CO2 SERPL-SCNC: 25 MMOL/L — SIGNIFICANT CHANGE UP (ref 22–31)
CREAT SERPL-MCNC: 3.02 MG/DL — HIGH (ref 0.5–1.3)
D DIMER BLD IA.RAPID-MCNC: 461 NG/ML DDU — HIGH
FERRITIN SERPL-MCNC: 213 NG/ML — SIGNIFICANT CHANGE UP (ref 30–400)
GLUCOSE SERPL-MCNC: 178 MG/DL — HIGH (ref 70–99)
LDH SERPL L TO P-CCNC: 375 U/L — HIGH (ref 84–241)
POTASSIUM SERPL-MCNC: 3.6 MMOL/L — SIGNIFICANT CHANGE UP (ref 3.5–5.3)
POTASSIUM SERPL-SCNC: 3.6 MMOL/L — SIGNIFICANT CHANGE UP (ref 3.5–5.3)
PROT SERPL-MCNC: 6.8 GM/DL — SIGNIFICANT CHANGE UP (ref 6–8.3)
SARS-COV-2 RNA SPEC QL NAA+PROBE: DETECTED
SODIUM SERPL-SCNC: 132 MMOL/L — LOW (ref 135–145)
TROPONIN I SERPL-MCNC: <0.015 NG/ML — SIGNIFICANT CHANGE UP (ref 0.01–0.04)

## 2020-04-01 PROCEDURE — 85025 COMPLETE CBC W/AUTO DIFF WBC: CPT

## 2020-04-01 PROCEDURE — 83883 ASSAY NEPHELOMETRY NOT SPEC: CPT

## 2020-04-01 PROCEDURE — 76770 US EXAM ABDO BACK WALL COMP: CPT | Mod: 26

## 2020-04-01 PROCEDURE — 86803 HEPATITIS C AB TEST: CPT

## 2020-04-01 PROCEDURE — 84156 ASSAY OF PROTEIN URINE: CPT

## 2020-04-01 PROCEDURE — 84300 ASSAY OF URINE SODIUM: CPT

## 2020-04-01 PROCEDURE — 84155 ASSAY OF PROTEIN SERUM: CPT

## 2020-04-01 PROCEDURE — 81001 URINALYSIS AUTO W/SCOPE: CPT

## 2020-04-01 PROCEDURE — 83735 ASSAY OF MAGNESIUM: CPT

## 2020-04-01 PROCEDURE — 84145 PROCALCITONIN (PCT): CPT

## 2020-04-01 PROCEDURE — 86334 IMMUNOFIX E-PHORESIS SERUM: CPT

## 2020-04-01 PROCEDURE — 85027 COMPLETE CBC AUTOMATED: CPT

## 2020-04-01 PROCEDURE — 83036 HEMOGLOBIN GLYCOSYLATED A1C: CPT

## 2020-04-01 PROCEDURE — 99223 1ST HOSP IP/OBS HIGH 75: CPT

## 2020-04-01 PROCEDURE — 80069 RENAL FUNCTION PANEL: CPT

## 2020-04-01 PROCEDURE — 84165 PROTEIN E-PHORESIS SERUM: CPT

## 2020-04-01 PROCEDURE — 76770 US EXAM ABDO BACK WALL COMP: CPT

## 2020-04-01 PROCEDURE — 36415 COLL VENOUS BLD VENIPUNCTURE: CPT

## 2020-04-01 PROCEDURE — 80053 COMPREHEN METABOLIC PANEL: CPT

## 2020-04-01 PROCEDURE — 85379 FIBRIN DEGRADATION QUANT: CPT

## 2020-04-01 PROCEDURE — 82570 ASSAY OF URINE CREATININE: CPT

## 2020-04-01 PROCEDURE — 84550 ASSAY OF BLOOD/URIC ACID: CPT

## 2020-04-01 PROCEDURE — 82962 GLUCOSE BLOOD TEST: CPT

## 2020-04-01 PROCEDURE — 83615 LACTATE (LD) (LDH) ENZYME: CPT

## 2020-04-01 PROCEDURE — 86140 C-REACTIVE PROTEIN: CPT

## 2020-04-01 PROCEDURE — 82728 ASSAY OF FERRITIN: CPT

## 2020-04-01 RX ORDER — ACETAMINOPHEN 500 MG
650 TABLET ORAL EVERY 4 HOURS
Refills: 0 | Status: DISCONTINUED | OUTPATIENT
Start: 2020-04-01 | End: 2020-04-05

## 2020-04-01 RX ORDER — HYDROXYCHLOROQUINE SULFATE 200 MG
200 TABLET ORAL EVERY 12 HOURS
Refills: 0 | Status: DISCONTINUED | OUTPATIENT
Start: 2020-04-02 | End: 2020-04-05

## 2020-04-01 RX ORDER — HYDROXYCHLOROQUINE SULFATE 200 MG
TABLET ORAL
Refills: 0 | Status: DISCONTINUED | OUTPATIENT
Start: 2020-04-01 | End: 2020-04-05

## 2020-04-01 RX ORDER — LEVETIRACETAM 250 MG/1
500 TABLET, FILM COATED ORAL ONCE
Refills: 0 | Status: COMPLETED | OUTPATIENT
Start: 2020-04-01 | End: 2020-04-01

## 2020-04-01 RX ORDER — LEVETIRACETAM 250 MG/1
500 TABLET, FILM COATED ORAL
Refills: 0 | Status: DISCONTINUED | OUTPATIENT
Start: 2020-04-01 | End: 2020-04-05

## 2020-04-01 RX ORDER — HYDROXYCHLOROQUINE SULFATE 200 MG
400 TABLET ORAL EVERY 12 HOURS
Refills: 0 | Status: COMPLETED | OUTPATIENT
Start: 2020-04-01 | End: 2020-04-01

## 2020-04-01 RX ORDER — LEVETIRACETAM 250 MG/1
750 TABLET, FILM COATED ORAL ONCE
Refills: 0 | Status: DISCONTINUED | OUTPATIENT
Start: 2020-04-01 | End: 2020-04-01

## 2020-04-01 RX ORDER — SODIUM CHLORIDE 9 MG/ML
1000 INJECTION INTRAMUSCULAR; INTRAVENOUS; SUBCUTANEOUS
Refills: 0 | Status: DISCONTINUED | OUTPATIENT
Start: 2020-04-01 | End: 2020-04-02

## 2020-04-01 RX ADMIN — Medication 650 MILLIGRAM(S): at 20:35

## 2020-04-01 RX ADMIN — Medication 650 MILLIGRAM(S): at 16:01

## 2020-04-01 RX ADMIN — LEVETIRACETAM 500 MILLIGRAM(S): 250 TABLET, FILM COATED ORAL at 09:17

## 2020-04-01 RX ADMIN — SODIUM CHLORIDE 50 MILLILITER(S): 9 INJECTION INTRAMUSCULAR; INTRAVENOUS; SUBCUTANEOUS at 12:47

## 2020-04-01 RX ADMIN — Medication 400 MILLIGRAM(S): at 09:17

## 2020-04-01 RX ADMIN — LEVETIRACETAM 500 MILLIGRAM(S): 250 TABLET, FILM COATED ORAL at 20:48

## 2020-04-01 RX ADMIN — Medication 400 MILLIGRAM(S): at 20:48

## 2020-04-01 RX ADMIN — Medication 650 MILLIGRAM(S): at 09:26

## 2020-04-01 NOTE — ED ADULT NURSE REASSESSMENT NOTE - NS ED NURSE REASSESS COMMENT FT1
Patient A&Ox4, resting in bed. Tylenol administered for fever as prescribed. All other VSS, denies pain/discomfort. Spoke with MD London regarding BGM and insulin sliding scale, awaiting orders. Plan of care discussed with patient. Hand-off report to BOSTON Burks, awaiting transport to . Safety & comfort measures in place, isolation precautions maintained. Purposeful proactive rounding on my time.

## 2020-04-01 NOTE — CONSULT NOTE ADULT - ASSESSMENT
60 yo male w hx of seizures, DM, fatigue, and COVID + , resp distress, fevers and now rising WILLIAMS     WILLIAMS in setting of COVID /pNA     Cr rising    gentle IVF    daily labs   urine lytes    renal sonogram     underlying CKD ? w hx of DM        Viral pneumonia  - plan per Medicine   - started on Plaquenil  - on NC 2 L sating 95%  - CXR  reviewed  - monitor COVID markers    d/w Dr Cesar

## 2020-04-01 NOTE — H&P ADULT - HISTORY OF PRESENT ILLNESS
58 yo male with h/o seizure d/o, DM c/o 7 days of feeling unwell.  +fatigue, fevers/chills, decreased PO intake, sob, coughing.  Last took tylenol at midnight. + diarrhea x 2 days.  No chest pain, abdominal pain, n/v.  No known sick contacts.  Nonsmoker.  Did not take his diabetes meds today.

## 2020-04-01 NOTE — CONSULT NOTE ADULT - SUBJECTIVE AND OBJECTIVE BOX
58 yo male with h/o seizure d/o, DM c/o 7 days of feeling unwell.  +fatigue, fevers/chills, decreased PO intake, sob, coughing.  Last took tylenol at midnight. + diarrhea x 2 days.  No chest pain, abdominal pain, n/v.  No known sick contacts.  Nonsmoker.  Did not take his diabetes meds today.    renal eval called for rising Cr  COVID +     last Creatinine 1.2 in march 2019   today feeling weak, cough   mild sob    no cp         PAST MEDICAL & SURGICAL HISTORY:  Arthritis: B/L knee pain in cold weather  Seizures: Last seizure was over 9 years ago  Diabetes  Elective surgery: Right upper arm injury 1994        Home Medications:  Janumet 50 mg-1000 mg oral tablet: 1 tab(s) orally 2 times a day (12 Mar 2019 16:05)  levETIRAcetam 750 mg oral tablet, extended release: 1 tab(s) orally once a day (at bedtime) (12 Mar 2019 16:05)  Moringa Green Tea: 1 drink orally once a day (12 Mar 2019 16:05)      MEDICATIONS  (STANDING):  hydroxychloroquine 400 milliGRAM(s) Oral every 12 hours  hydroxychloroquine   Oral   levETIRAcetam 500 milliGRAM(s) Oral two times a day  sodium chloride 0.9%. 1000 milliLiter(s) (50 mL/Hr) IV Continuous <Continuous>    MEDICATIONS  (PRN):  acetaminophen   Tablet .. 650 milliGRAM(s) Oral every 4 hours PRN Temp greater or equal to 38.5C (101.3F)        Allergies    iodine containing compounds (Rash; Blisters; Pruritus)    Intolerances        SOCIAL HISTORY:  Denies ETOh,Smoking,     FAMILY HISTORY:      REVIEW OF SYSTEMS:    CONSTITUTIONAL: No weakness, fevers or chills  EYES/ENT: No visual changes;  No vertigo or throat pain   NECK: No pain or stiffness  RESPIRATORY: No cough, wheezing, hemoptysis; No shortness of breath  CARDIOVASCULAR: No chest pain or palpitations  GASTROINTESTINAL: No abdominal or epigastric pain. No nausea, vomiting, or hematemesis; No diarrhea or constipation. No melena or hematochezia.  GENITOURINARY: No dysuria, frequency or hematuria  NEUROLOGICAL: No numbness or weakness  SKIN: No itching, burning, rashes, or lesions   All other review of systems is negative unless indicated above.    VITAL:  Vital Signs Last 24 Hrs  T(C): 38.5 (01 Apr 2020 16:05), Max: 39.3 (31 Mar 2020 21:50)  T(F): 101.3 (01 Apr 2020 16:05), Max: 102.8 (31 Mar 2020 21:50)  HR: 82 (01 Apr 2020 16:05) (79 - 101)  BP: 132/70 (01 Apr 2020 16:05) (121/67 - 139/78)  BP(mean): 96 (01 Apr 2020 03:52) (89 - 96)  RR: 19 (01 Apr 2020 16:05) (16 - 20)  SpO2: 98% (01 Apr 2020 16:05) (93% - 98%)      PHYSICAL EXAM:    Constitutional: NAD  HEENT:  EOMI,  MMM  Neck: No LAD, No JVD  Respiratory: CTAB  Cardiovascular: S1 and S2  Gastrointestinal: BS+, soft, NT/ND  Extremities: No peripheral edema  Neurological: A/O x 3, no focal deficits  : No Gamble  Skin: No rashes  Access: Not applicable    LABS:               133    |  102    |  40     ----------------------------<  169       01 Apr 2020 10:20  3.7     |  21     |  2.90     132    |  97     |  39     ----------------------------<  178       31 Mar 2020 23:33  3.6     |  25     |  3.02     Ca    7.4        01 Apr 2020 10:20  Ca    8.0        31 Mar 2020 23:33    Phos  3.9       01 Apr 2020 10:20      TPro  x      /  Alb  1.9    /  TBili  x      /        01 Apr 2020 10:20  DBili  x      /  AST  x      /  ALT  x      /  AlkPhos  x        TPro  6.8    /  Alb  2.2    /  TBili  0.3    /        31 Mar 2020 23:33  DBili  x      /  AST  31     /  ALT  31     /  AlkPhos  62                             15.6   6.90  )-----------( 200      ( 31 Mar 2020 23:33 )             45.9         Urine Studies:          RADIOLOGY & ADDITIONAL STUDIES:

## 2020-04-01 NOTE — H&P ADULT - PROBLEM SELECTOR PLAN 2
- monitor kidney function  - unknown baseline kidney function - Acute on chronic renal disease, unknown baseline, renal sono suggestive of chronic renal dz  - monitor kidney function  - unknown baseline kidney function

## 2020-04-01 NOTE — H&P ADULT - NSHPPHYSICALEXAM_GEN_ALL_CORE
Physical Exam:   GENERAL APPEARANCE:  NAD, hemodynamically stable  T(C): 37.1 (04-01-20 @ 03:52), Max: 39.3 (03-31-20 @ 21:50)  HR: 79 (04-01-20 @ 03:52) (79 - 101)  BP: 129/80 (04-01-20 @ 03:52) (128/76 - 138/75)  RR: 20 (04-01-20 @ 03:52) (18 - 20)  SpO2: 97% (04-01-20 @ 03:52) (93% - 97%)  Wt(kg): --  HEENT:  Head is normocephalic    Skin:  Warm and dry without any rash   NECK:  Supple without lymphadenopathy.   HEART:  Regular rate and rhythm. normal S1 and S2, No M/R/G  LUNGS:  + ronchi  ABDOMEN:  Soft, nontender, nondistended with good bowel sounds heard  EXTREMITIES:  Without cyanosis, clubbing or edema.   NEUROLOGICAL:  Gross nonfocal

## 2020-04-01 NOTE — H&P ADULT - NSHPLABSRESULTS_GEN_ALL_CORE
CBC Full  -  ( 31 Mar 2020 23:33 )  WBC Count : 6.90 K/uL  RBC Count : 5.78 M/uL  Hemoglobin : 15.6 g/dL  Hematocrit : 45.9 %  Platelet Count - Automated : 200 K/uL  Mean Cell Volume : 79.4 fl  Mean Cell Hemoglobin : 27.0 pg  Mean Cell Hemoglobin Concentration : 34.0 gm/dL  Auto Neutrophil # : 5.36 K/uL  Auto Lymphocyte # : 0.91 K/uL  Auto Monocyte # : 0.59 K/uL  Auto Eosinophil # : 0.00 K/uL  Auto Basophil # : 0.02 K/uL  Auto Neutrophil % : 77.6 %  Auto Lymphocyte % : 13.2 %  Auto Monocyte % : 8.6 %  Auto Eosinophil % : 0.0 %  Auto Basophil % : 0.3 %        03-31    132<L>  |  97  |  39<H>  ----------------------------<  178<H>  3.6   |  25  |  3.02<H>    Ca    8.0<L>      31 Mar 2020 23:33    TPro  6.8  /  Alb  2.2<L>  /  TBili  0.3  /  DBili  x   /  AST  31  /  ALT  31  /  AlkPhos  62  03-31

## 2020-04-01 NOTE — H&P ADULT - PROBLEM SELECTOR PLAN 1
- COVID +  - started on Plaquenil  - on NC 2 L sating 95%  - CXRAY reviewed  - monitor COVID markers

## 2020-04-01 NOTE — ED ADULT NURSE REASSESSMENT NOTE - NS ED NURSE REASSESS COMMENT FT1
Pt moved over from main ED to peds. Vitals taken and stable. Pt satting 98% on 2 liters. Pt oriented to room, call bell light in reach. All needs addressed and safety maintained.

## 2020-04-02 LAB
ALBUMIN SERPL ELPH-MCNC: 1.7 G/DL — LOW (ref 3.3–5)
ALP SERPL-CCNC: 59 U/L — SIGNIFICANT CHANGE UP (ref 40–120)
ALT FLD-CCNC: 25 U/L — SIGNIFICANT CHANGE UP (ref 12–78)
ANION GAP SERPL CALC-SCNC: 10 MMOL/L — SIGNIFICANT CHANGE UP (ref 5–17)
AST SERPL-CCNC: 29 U/L — SIGNIFICANT CHANGE UP (ref 15–37)
BASOPHILS # BLD AUTO: 0.03 K/UL — SIGNIFICANT CHANGE UP (ref 0–0.2)
BASOPHILS NFR BLD AUTO: 0.3 % — SIGNIFICANT CHANGE UP (ref 0–2)
BILIRUB SERPL-MCNC: 0.3 MG/DL — SIGNIFICANT CHANGE UP (ref 0.2–1.2)
BUN SERPL-MCNC: 45 MG/DL — HIGH (ref 7–23)
CALCIUM SERPL-MCNC: 7.6 MG/DL — LOW (ref 8.5–10.1)
CHLORIDE SERPL-SCNC: 102 MMOL/L — SIGNIFICANT CHANGE UP (ref 96–108)
CO2 SERPL-SCNC: 23 MMOL/L — SIGNIFICANT CHANGE UP (ref 22–31)
CREAT SERPL-MCNC: 3.47 MG/DL — HIGH (ref 0.5–1.3)
EOSINOPHIL # BLD AUTO: 0 K/UL — SIGNIFICANT CHANGE UP (ref 0–0.5)
EOSINOPHIL NFR BLD AUTO: 0 % — SIGNIFICANT CHANGE UP (ref 0–6)
GLUCOSE SERPL-MCNC: 161 MG/DL — HIGH (ref 70–99)
HBA1C BLD-MCNC: 9.1 % — HIGH (ref 4–5.6)
HCT VFR BLD CALC: 44.1 % — SIGNIFICANT CHANGE UP (ref 39–50)
HCV AB S/CO SERPL IA: 0.11 S/CO — SIGNIFICANT CHANGE UP (ref 0–0.99)
HCV AB SERPL-IMP: SIGNIFICANT CHANGE UP
HGB BLD-MCNC: 14.8 G/DL — SIGNIFICANT CHANGE UP (ref 13–17)
IMM GRANULOCYTES NFR BLD AUTO: 0.5 % — SIGNIFICANT CHANGE UP (ref 0–1.5)
LYMPHOCYTES # BLD AUTO: 1.44 K/UL — SIGNIFICANT CHANGE UP (ref 1–3.3)
LYMPHOCYTES # BLD AUTO: 14.2 % — SIGNIFICANT CHANGE UP (ref 13–44)
MCHC RBC-ENTMCNC: 27.1 PG — SIGNIFICANT CHANGE UP (ref 27–34)
MCHC RBC-ENTMCNC: 33.6 GM/DL — SIGNIFICANT CHANGE UP (ref 32–36)
MCV RBC AUTO: 80.8 FL — SIGNIFICANT CHANGE UP (ref 80–100)
MONOCYTES # BLD AUTO: 0.38 K/UL — SIGNIFICANT CHANGE UP (ref 0–0.9)
MONOCYTES NFR BLD AUTO: 3.7 % — SIGNIFICANT CHANGE UP (ref 2–14)
NEUTROPHILS # BLD AUTO: 8.27 K/UL — HIGH (ref 1.8–7.4)
NEUTROPHILS NFR BLD AUTO: 81.3 % — HIGH (ref 43–77)
PLATELET # BLD AUTO: 198 K/UL — SIGNIFICANT CHANGE UP (ref 150–400)
POTASSIUM SERPL-MCNC: 3.9 MMOL/L — SIGNIFICANT CHANGE UP (ref 3.5–5.3)
POTASSIUM SERPL-SCNC: 3.9 MMOL/L — SIGNIFICANT CHANGE UP (ref 3.5–5.3)
PROT SERPL-MCNC: 6.1 GM/DL — SIGNIFICANT CHANGE UP (ref 6–8.3)
RBC # BLD: 5.46 M/UL — SIGNIFICANT CHANGE UP (ref 4.2–5.8)
RBC # FLD: 13.2 % — SIGNIFICANT CHANGE UP (ref 10.3–14.5)
SODIUM SERPL-SCNC: 135 MMOL/L — SIGNIFICANT CHANGE UP (ref 135–145)
WBC # BLD: 10.17 K/UL — SIGNIFICANT CHANGE UP (ref 3.8–10.5)
WBC # FLD AUTO: 10.17 K/UL — SIGNIFICANT CHANGE UP (ref 3.8–10.5)

## 2020-04-02 PROCEDURE — 99233 SBSQ HOSP IP/OBS HIGH 50: CPT

## 2020-04-02 RX ORDER — GLUCAGON INJECTION, SOLUTION 0.5 MG/.1ML
1 INJECTION, SOLUTION SUBCUTANEOUS ONCE
Refills: 0 | Status: DISCONTINUED | OUTPATIENT
Start: 2020-04-02 | End: 2020-04-05

## 2020-04-02 RX ORDER — SODIUM CHLORIDE 9 MG/ML
1000 INJECTION, SOLUTION INTRAVENOUS
Refills: 0 | Status: DISCONTINUED | OUTPATIENT
Start: 2020-04-02 | End: 2020-04-03

## 2020-04-02 RX ORDER — DEXTROSE 50 % IN WATER 50 %
12.5 SYRINGE (ML) INTRAVENOUS ONCE
Refills: 0 | Status: DISCONTINUED | OUTPATIENT
Start: 2020-04-02 | End: 2020-04-05

## 2020-04-02 RX ORDER — SODIUM CHLORIDE 9 MG/ML
1000 INJECTION, SOLUTION INTRAVENOUS
Refills: 0 | Status: DISCONTINUED | OUTPATIENT
Start: 2020-04-02 | End: 2020-04-05

## 2020-04-02 RX ORDER — INSULIN LISPRO 100/ML
VIAL (ML) SUBCUTANEOUS AT BEDTIME
Refills: 0 | Status: DISCONTINUED | OUTPATIENT
Start: 2020-04-02 | End: 2020-04-05

## 2020-04-02 RX ORDER — DEXTROSE 50 % IN WATER 50 %
25 SYRINGE (ML) INTRAVENOUS ONCE
Refills: 0 | Status: DISCONTINUED | OUTPATIENT
Start: 2020-04-02 | End: 2020-04-05

## 2020-04-02 RX ORDER — DEXTROSE 50 % IN WATER 50 %
15 SYRINGE (ML) INTRAVENOUS ONCE
Refills: 0 | Status: DISCONTINUED | OUTPATIENT
Start: 2020-04-02 | End: 2020-04-05

## 2020-04-02 RX ADMIN — LEVETIRACETAM 500 MILLIGRAM(S): 250 TABLET, FILM COATED ORAL at 10:30

## 2020-04-02 RX ADMIN — Medication 650 MILLIGRAM(S): at 22:00

## 2020-04-02 RX ADMIN — Medication 80 MILLIGRAM(S): at 22:00

## 2020-04-02 RX ADMIN — LEVETIRACETAM 500 MILLIGRAM(S): 250 TABLET, FILM COATED ORAL at 22:00

## 2020-04-02 RX ADMIN — SODIUM CHLORIDE 75 MILLILITER(S): 9 INJECTION, SOLUTION INTRAVENOUS at 18:43

## 2020-04-02 RX ADMIN — Medication 200 MILLIGRAM(S): at 10:30

## 2020-04-02 RX ADMIN — Medication 200 MILLIGRAM(S): at 21:59

## 2020-04-02 RX ADMIN — Medication 650 MILLIGRAM(S): at 10:30

## 2020-04-02 NOTE — PROGRESS NOTE ADULT - SUBJECTIVE AND OBJECTIVE BOX
60 yo male with h/o seizure d/o, DM c/o 7 days of feeling unwell.  +fatigue, fevers/chills, decreased PO intake, sob, coughing.  Last took tylenol at midnight. + diarrhea x 2 days.  No chest pain, abdominal pain, n/v.  No known sick contacts.  Nonsmoker.  Did not take his diabetes meds today.    renal eval called for rising Cr  COVID +     last Creatinine 1.2 in march 2019   today feeling weak, cough   mild sob    fevers of 102 today            PAST MEDICAL & SURGICAL HISTORY:  Arthritis: B/L knee pain in cold weather  Seizures: Last seizure was over 9 years ago  Diabetes  Elective surgery: Right upper arm injury 1994        Home Medications:  Janumet 50 mg-1000 mg oral tablet: 1 tab(s) orally 2 times a day (12 Mar 2019 16:05)  levETIRAcetam 750 mg oral tablet, extended release: 1 tab(s) orally once a day (at bedtime) (12 Mar 2019 16:05)  Moringa Green Tea: 1 drink orally once a day (12 Mar 2019 16:05)      MEDICATIONS  (STANDING):  dextrose 5%. 1000 milliLiter(s) (50 mL/Hr) IV Continuous <Continuous>  dextrose 50% Injectable 12.5 Gram(s) IV Push once  dextrose 50% Injectable 25 Gram(s) IV Push once  dextrose 50% Injectable 25 Gram(s) IV Push once  hydroxychloroquine 200 milliGRAM(s) Oral every 12 hours  hydroxychloroquine   Oral   insulin lispro (HumaLOG) corrective regimen sliding scale   SubCutaneous at bedtime  insulin lispro (HumaLOG) corrective regimen sliding scale   SubCutaneous at bedtime  levETIRAcetam 500 milliGRAM(s) Oral two times a day  pravastatin 80 milliGRAM(s) Oral at bedtime  sodium chloride 0.45%. 1000 milliLiter(s) (75 mL/Hr) IV Continuous <Continuous>    MEDICATIONS  (PRN):  acetaminophen   Tablet .. 650 milliGRAM(s) Oral every 4 hours PRN Temp greater or equal to 38.5C (101.3F)  dextrose 40% Gel 15 Gram(s) Oral once PRN Blood Glucose LESS THAN 70 milliGRAM(s)/deciliter  glucagon  Injectable 1 milliGRAM(s) IntraMuscular once PRN Glucose LESS THAN 70 milligrams/deciliter      Allergies    iodine containing compounds (Rash; Blisters; Pruritus)    Intolerances        SOCIAL HISTORY:  Denies ETOh,Smoking,     FAMILY HISTORY:      REVIEW OF SYSTEMS:    CONSTITUTIONAL: No weakness, fevers or chills  EYES/ENT: No visual changes;  No vertigo or throat pain   NECK: No pain or stiffness  RESPIRATORY: + sob and cough  CARDIOVASCULAR: No chest pain or palpitations  GASTROINTESTINAL: No abdominal or epigastric pain. No nausea, vomiting, or hematemesis; No diarrhea or constipation. No melena or hematochezia.  GENITOURINARY: No dysuria, frequency or hematuria  NEUROLOGICAL: No numbness or weakness  SKIN: No itching, burning, rashes, or lesions   All other review of systems is negative unless indicated above.    VITAL:  Vital Signs Last 24 Hrs  T(C): 38.5 (01 Apr 2020 16:05), Max: 39.3 (31 Mar 2020 21:50)  T(F): 101.3 (01 Apr 2020 16:05), Max: 102.8 (31 Mar 2020 21:50)  HR: 82 (01 Apr 2020 16:05) (79 - 101)  BP: 132/70 (01 Apr 2020 16:05) (121/67 - 139/78)  BP(mean): 96 (01 Apr 2020 03:52) (89 - 96)  RR: 19 (01 Apr 2020 16:05) (16 - 20)  SpO2: 98% (01 Apr 2020 16:05) (93% - 98%)      PHYSICAL EXAM:    Constitutional: NAD  HEENT:  EOMI,  MMM  Neck: No LAD, No JVD  Respiratory: poor ae   Cardiovascular: S1 and S2  Gastrointestinal: BS+, soft, NT/ND  Extremities: No peripheral edema  Neurological: A/O x 3, no focal deficits  : No Gamble  Skin: No rashes  Access: Not applicable      135    |  102    |  45     ----------------------------<  161       02 Apr 2020 08:56  3.9     |  23     |  3.47     133    |  102    |  40     ----------------------------<  169       01 Apr 2020 10:20  3.7     |  21     |  2.90     132    |  97     |  39     ----------------------------<  178       31 Mar 2020 23:33  3.6     |  25     |  3.02     Ca    7.6        02 Apr 2020 08:56  Ca    7.4        01 Apr 2020 10:20    Phos  3.9       01 Apr 2020 10:20      TPro  6.1    /  Alb  1.7    /  TBili  0.3    /        02 Apr 2020 08:56  DBili  x      /  AST  29     /  ALT  25     /  AlkPhos  59       TPro  x      /  Alb  1.9    /  TBili  x      /        01 Apr 2020 10:20  DBili  x      /  AST  x      /  ALT  x      /  AlkPhos  x                         Urine Studies:          RADIOLOGY & ADDITIONAL STUDIES:

## 2020-04-02 NOTE — PROGRESS NOTE ADULT - ASSESSMENT
58 yo male w hx of seizures, DM, fatigue, and COVID + , resp distress, fevers and now rising WILLIAMS     WILLIAMS in setting of COVID /pNA     Cr continues to rise in setting of fevers and poor UOP    Cr baseline 1.2 in 2019    renal sono - + echogenic kidneys in setting of DM   await urine studies   continue gentle IVF   daily labs       Viral pneumonia  - plan per Medicine   - started on Plaquenil  - on NC 2 L sating 95%  - monitor COVID markers    d/w 2 [facility]

## 2020-04-02 NOTE — PROGRESS NOTE ADULT - ASSESSMENT
#Viral Pneumonia  - COVID +  - started on Plaquenil  - on NC 2 L sating 95%  - CXRAY reviewed  - monitor COVID markers    #WILLIAMS on CKD?  - Acute on chronic renal disease, unknown baseline, renal sono suggestive of chronic renal dz  - monitor kidney function  - unknown baseline kidney func      #Seizure Disorder  - continue with home meds    #DM2  - ISS  - DM diet    #Hyponatremia  - monitor, most likely secondary dehydration 60 yo male w hx of seizures, DM, fatigue, and COVID + , resp distress, fevers and now rising WILLIAMS     #Viral Pneumonia  - Clinically doing well  - COVID positive  - continue with Plaquenil  - on NC 2 L sating %  - Continue to monitor clinically      #WILLIAMS on CKD?  - Creatine rising  - Acute on chronic renal disease, CKD secondary to DM2?  - unknown baseline kidney function  - Nephro following, Dr. Calloway, recs appreciated , continue IVFs, will follow up    #Seizure Disorder  - continue with home med: Keppra 500 mg BID    #DM2  - Elevated serum glucose, will obtain A1c  - BGM QHS  - ISS   - DM diet    #Hyponatremia  - Improved s/p IVF  - Continue to monitor with morning labs       Dispo: Continued hospitalization, if does well on RA can plan for DC    Case discussed with Dr. Carey

## 2020-04-02 NOTE — PROGRESS NOTE ADULT - SUBJECTIVE AND OBJECTIVE BOX
Patient is a 58 y/o male with DM2, possible CKD, seizure disorder who presented to hospital on 3/31 with fevers and fatigue, found to be COVID19 positive, admitted for Acute Hypoxic Respiratory Failure secondary to COVID19. Nephrology consulted, Dr. Elli waterman.    SUBJECTIVE: Patient seen this AM . Doing well on supplemental oxygen. Afebrile    REVIEW OF SYSTEMS:  All other review of systems is negative unless indicated above    Vital Signs Last 24 Hrs  T(C): 39.3 (02 Apr 2020 09:50), Max: 39.4 (01 Apr 2020 20:26)  T(F): 102.8 (02 Apr 2020 09:50), Max: 102.9 (01 Apr 2020 20:26)  HR: 92 (02 Apr 2020 09:50) (82 - 92)  BP: 117/53 (02 Apr 2020 09:50) (117/53 - 132/70)  RR: 20 (02 Apr 2020 09:50) (19 - 22)  SpO2: 95% (02 Apr 2020 09:50) (93% - 98%)    I&O's Summary    01 Apr 2020 07:01  -  02 Apr 2020 07:00  --------------------------------------------------------  IN: 770 mL / OUT: 600 mL / NET: 170 mL    02 Apr 2020 07:01  -  02 Apr 2020 12:31  --------------------------------------------------------  IN: 0 mL / OUT: 500 mL / NET: -500 mL      PHYSICAL EXAM:  Constitutional: NAD, awake and alert, well-developed  HEENT: PERR, EOMI, Normal Hearing, MMM  Neck: Soft and supple, No LAD, No JVD  Respiratory: Breath sounds are clear bilaterally, No wheezing, rales or rhonchi  Cardiovascular: S1 and S2, regular rate and rhythm, no Murmurs, gallops or rubs  Gastrointestinal: Bowel Sounds present, soft, nontender, nondistended, no guarding, no rebound  Extremities: No peripheral edema  Vascular: 2+ peripheral pulses  Neurological: A/O x 3, no focal deficits  Musculoskeletal: 5/5 strength b/l upper and lower extremities  Skin: No rashes    MEDICATIONS:  MEDICATIONS  (STANDING):  hydroxychloroquine 200 milliGRAM(s) Oral every 12 hours  hydroxychloroquine   Oral   levETIRAcetam 500 milliGRAM(s) Oral two times a day  sodium chloride 0.9%. 1000 milliLiter(s) (50 mL/Hr) IV Continuous <Continuous>      LABS: All Labs Reviewed:                        14.8   10.17 )-----------( 198      ( 02 Apr 2020 08:56 )             44.1     04-02    135  |  102  |  45<H>  ----------------------------<  161<H>  3.9   |  23  |  3.47<H>    Ca    7.6<L>      02 Apr 2020 08:56  Phos  3.9     04-01    TPro  6.1  /  Alb  1.7<L>  /  TBili  0.3  /  DBili  x   /  AST  29  /  ALT  25  /  AlkPhos  59  04-02

## 2020-04-03 LAB
ALBUMIN SERPL ELPH-MCNC: 1.5 G/DL — LOW (ref 3.3–5)
ALP SERPL-CCNC: 57 U/L — SIGNIFICANT CHANGE UP (ref 40–120)
ALT FLD-CCNC: 21 U/L — SIGNIFICANT CHANGE UP (ref 12–78)
ANION GAP SERPL CALC-SCNC: 11 MMOL/L — SIGNIFICANT CHANGE UP (ref 5–17)
APPEARANCE UR: CLEAR — SIGNIFICANT CHANGE UP
AST SERPL-CCNC: 33 U/L — SIGNIFICANT CHANGE UP (ref 15–37)
BASOPHILS # BLD AUTO: 0.02 K/UL — SIGNIFICANT CHANGE UP (ref 0–0.2)
BASOPHILS NFR BLD AUTO: 0.3 % — SIGNIFICANT CHANGE UP (ref 0–2)
BILIRUB SERPL-MCNC: 0.3 MG/DL — SIGNIFICANT CHANGE UP (ref 0.2–1.2)
BILIRUB UR-MCNC: NEGATIVE — SIGNIFICANT CHANGE UP
BUN SERPL-MCNC: 56 MG/DL — HIGH (ref 7–23)
CALCIUM SERPL-MCNC: 7.7 MG/DL — LOW (ref 8.5–10.1)
CHLORIDE SERPL-SCNC: 104 MMOL/L — SIGNIFICANT CHANGE UP (ref 96–108)
CO2 SERPL-SCNC: 21 MMOL/L — LOW (ref 22–31)
COLOR SPEC: YELLOW — SIGNIFICANT CHANGE UP
CREAT ?TM UR-MCNC: 165 MG/DL — SIGNIFICANT CHANGE UP
CREAT SERPL-MCNC: 3.46 MG/DL — HIGH (ref 0.5–1.3)
DIFF PNL FLD: ABNORMAL
EOSINOPHIL # BLD AUTO: 0.01 K/UL — SIGNIFICANT CHANGE UP (ref 0–0.5)
EOSINOPHIL NFR BLD AUTO: 0.1 % — SIGNIFICANT CHANGE UP (ref 0–6)
GLUCOSE SERPL-MCNC: 128 MG/DL — HIGH (ref 70–99)
GLUCOSE UR QL: 50 MG/DL
HBA1C BLD-MCNC: 8.9 % — HIGH (ref 4–5.6)
HCT VFR BLD CALC: 41.6 % — SIGNIFICANT CHANGE UP (ref 39–50)
HGB BLD-MCNC: 14.2 G/DL — SIGNIFICANT CHANGE UP (ref 13–17)
IMM GRANULOCYTES NFR BLD AUTO: 0.4 % — SIGNIFICANT CHANGE UP (ref 0–1.5)
KETONES UR-MCNC: NEGATIVE — SIGNIFICANT CHANGE UP
LEUKOCYTE ESTERASE UR-ACNC: NEGATIVE — SIGNIFICANT CHANGE UP
LYMPHOCYTES # BLD AUTO: 1.47 K/UL — SIGNIFICANT CHANGE UP (ref 1–3.3)
LYMPHOCYTES # BLD AUTO: 19.1 % — SIGNIFICANT CHANGE UP (ref 13–44)
MCHC RBC-ENTMCNC: 27.7 PG — SIGNIFICANT CHANGE UP (ref 27–34)
MCHC RBC-ENTMCNC: 34.1 GM/DL — SIGNIFICANT CHANGE UP (ref 32–36)
MCV RBC AUTO: 81.3 FL — SIGNIFICANT CHANGE UP (ref 80–100)
MONOCYTES # BLD AUTO: 0.46 K/UL — SIGNIFICANT CHANGE UP (ref 0–0.9)
MONOCYTES NFR BLD AUTO: 6 % — SIGNIFICANT CHANGE UP (ref 2–14)
NEUTROPHILS # BLD AUTO: 5.69 K/UL — SIGNIFICANT CHANGE UP (ref 1.8–7.4)
NEUTROPHILS NFR BLD AUTO: 74.1 % — SIGNIFICANT CHANGE UP (ref 43–77)
NITRITE UR-MCNC: NEGATIVE — SIGNIFICANT CHANGE UP
PH UR: 5 — SIGNIFICANT CHANGE UP (ref 5–8)
PLATELET # BLD AUTO: 226 K/UL — SIGNIFICANT CHANGE UP (ref 150–400)
POTASSIUM SERPL-MCNC: 3.8 MMOL/L — SIGNIFICANT CHANGE UP (ref 3.5–5.3)
POTASSIUM SERPL-SCNC: 3.8 MMOL/L — SIGNIFICANT CHANGE UP (ref 3.5–5.3)
PROT ?TM UR-MCNC: 1001 MG/DL — HIGH (ref 0–12)
PROT SERPL-MCNC: 5.9 GM/DL — LOW (ref 6–8.3)
PROT UR-MCNC: 500 MG/DL
PROT/CREAT UR-RTO: 6.1 RATIO — HIGH (ref 0–0.2)
RBC # BLD: 5.12 M/UL — SIGNIFICANT CHANGE UP (ref 4.2–5.8)
RBC # FLD: 13.3 % — SIGNIFICANT CHANGE UP (ref 10.3–14.5)
SODIUM SERPL-SCNC: 136 MMOL/L — SIGNIFICANT CHANGE UP (ref 135–145)
SODIUM UR-SCNC: <20 MMOL/L — SIGNIFICANT CHANGE UP
SP GR SPEC: 1.01 — SIGNIFICANT CHANGE UP (ref 1.01–1.02)
UROBILINOGEN FLD QL: NEGATIVE MG/DL — SIGNIFICANT CHANGE UP
WBC # BLD: 7.68 K/UL — SIGNIFICANT CHANGE UP (ref 3.8–10.5)
WBC # FLD AUTO: 7.68 K/UL — SIGNIFICANT CHANGE UP (ref 3.8–10.5)

## 2020-04-03 PROCEDURE — 99233 SBSQ HOSP IP/OBS HIGH 50: CPT

## 2020-04-03 RX ORDER — SODIUM CHLORIDE 9 MG/ML
1000 INJECTION, SOLUTION INTRAVENOUS
Refills: 0 | Status: DISCONTINUED | OUTPATIENT
Start: 2020-04-03 | End: 2020-04-05

## 2020-04-03 RX ADMIN — Medication 200 MILLIGRAM(S): at 10:06

## 2020-04-03 RX ADMIN — SODIUM CHLORIDE 75 MILLILITER(S): 9 INJECTION, SOLUTION INTRAVENOUS at 06:22

## 2020-04-03 RX ADMIN — Medication 200 MILLIGRAM(S): at 23:01

## 2020-04-03 RX ADMIN — Medication 80 MILLIGRAM(S): at 23:02

## 2020-04-03 RX ADMIN — LEVETIRACETAM 500 MILLIGRAM(S): 250 TABLET, FILM COATED ORAL at 23:02

## 2020-04-03 RX ADMIN — LEVETIRACETAM 500 MILLIGRAM(S): 250 TABLET, FILM COATED ORAL at 10:06

## 2020-04-03 NOTE — PROGRESS NOTE ADULT - ASSESSMENT
60 yo male w hx of seizures, DM, fatigue, and COVID + , resp distress, fevers and now rising WILLIAMS     #Viral Pneumonia  - Clinically doing well  - COVID positive  - continue with Plaquenil  - on NC 2 L sating %  - Continue to monitor clinically      #WILLIAMS on CKD?  - Creatine still elevated but stable  - Urine Sodium pending  - Acute on chronic renal disease, CKD secondary to DM2?  - unknown baseline kidney function  - Nephro following, Dr. Calloway, recs appreciated , continue IVFs, will follow up    #Seizure Disorder  - continue with home med: Keppra 500 mg BID    #DM2  - BGM QHS  - ISS   - DM diet    #Hyponatremia  - Improved s/p IVF  - Continue to monitor with morning labs       Dispo: Continued hospitalization, if does well on RA can plan for DC toimorrow    Case discussed with Dr. Carey

## 2020-04-03 NOTE — PROGRESS NOTE ADULT - ASSESSMENT
58 yo male w hx of seizures, DM, fatigue, and COVID + , resp distress, fevers and now rising WILLIAMS     WILLIAMS in setting of COVID /pNA     Cr continues to rise - hopefully platuea   Cr baseline 1.2 in 2019    urine protein ~ 6 grams !!- sec to DM   no hematuria    check SPEP, IF, FLC    renal sono - + echogenic kidneys in setting of DM   urine NA < 20 -  continue gentle IVF x 20 hours    daily labs     Viral pneumonia  - plan per Medicine / Plaquenil  - on NC 2 L sating 95%  - monitor COVID markers

## 2020-04-03 NOTE — PROGRESS NOTE ADULT - SUBJECTIVE AND OBJECTIVE BOX
Patient is a 58 y/o male with DM2, possible CKD, seizure disorder who presented to hospital on 3/31 with fevers and fatigue, found to be COVID19 positive, admitted for Acute Hypoxic Respiratory Failure secondary to COVID19. Nephrology consulted, Dr. Elli waterman.    SUBJECTIVE: Patient seen this AM . Doing well on supplemental oxygen. One time overnight fever    REVIEW OF SYSTEMS:  All other review of systems is negative unless indicated above    Vital Signs Last 24 Hrs  T(C): 36.7 (03 Apr 2020 09:32), Max: 39.2 (02 Apr 2020 21:05)  T(F): 98.1 (03 Apr 2020 09:32), Max: 102.5 (02 Apr 2020 21:05)  HR: 79 (03 Apr 2020 09:32) (77 - 96)  BP: 131/74 (03 Apr 2020 09:32) (116/63 - 139/72)  RR: 21 (03 Apr 2020 09:32) (19 - 22)  SpO2: 97% (03 Apr 2020 09:32) (95% - 97%)    I&O's Summary    02 Apr 2020 07:01  -  03 Apr 2020 07:00  --------------------------------------------------------  IN: 980 mL / OUT: 500 mL / NET: 480 mL        CAPILLARY BLOOD GLUCOSE  POCT Blood Glucose.: 139 mg/dL (02 Apr 2020 21:54)      PHYSICAL EXAM:  Constitutional: NAD, awake and alert, well-developed  HEENT: PERR, EOMI, Normal Hearing, MMM  Neck: Soft and supple, No LAD, No JVD  Respiratory: Breath sounds are clear bilaterally, No wheezing, rales or rhonchi  Cardiovascular: S1 and S2, regular rate and rhythm, no Murmurs, gallops or rubs  Gastrointestinal: Bowel Sounds present, soft, nontender, nondistended, no guarding, no rebound  Extremities: No peripheral edema  Vascular: 2+ peripheral pulses  Neurological: A/O x 3, no focal deficits  Musculoskeletal: 5/5 strength b/l upper and lower extremities  Skin: No rashes    MEDICATIONS:  MEDICATIONS  (STANDING):  dextrose 5%. 1000 milliLiter(s) (50 mL/Hr) IV Continuous <Continuous>  dextrose 50% Injectable 12.5 Gram(s) IV Push once  dextrose 50% Injectable 25 Gram(s) IV Push once  dextrose 50% Injectable 25 Gram(s) IV Push once  hydroxychloroquine 200 milliGRAM(s) Oral every 12 hours  hydroxychloroquine   Oral   insulin lispro (HumaLOG) corrective regimen sliding scale   SubCutaneous at bedtime  insulin lispro (HumaLOG) corrective regimen sliding scale   SubCutaneous at bedtime  levETIRAcetam 500 milliGRAM(s) Oral two times a day  pravastatin 80 milliGRAM(s) Oral at bedtime  sodium chloride 0.45%. 1000 milliLiter(s) (75 mL/Hr) IV Continuous <Continuous>      LABS: All Labs Reviewed:                        14.2   7.68  )-----------( 226      ( 03 Apr 2020 08:33 )             41.6     04-03    136  |  104  |  56<H>  ----------------------------<  128<H>  3.8   |  21<L>  |  3.46<H>    Ca    7.7<L>      03 Apr 2020 08:33    TPro  5.9<L>  /  Alb  1.5<L>  /  TBili  0.3  /  DBili  x   /  AST  33  /  ALT  21  /  AlkPhos  57  04-03

## 2020-04-03 NOTE — PROGRESS NOTE ADULT - SUBJECTIVE AND OBJECTIVE BOX
** seen earlier today    no new events     58 yo male with h/o seizure d/o, DM c/o 7 days of feeling unwell.  +fatigue, fevers/chills, decreased PO intake, sob, coughing.  Last took tylenol at midnight. + diarrhea x 2 days.  No chest pain, abdominal pain, n/v.  No known sick contacts.  Nonsmoker.  Did not take his diabetes meds today.    renal eval called for rising Cr  COVID +     last Creatinine 1.2 in march 2019            PAST MEDICAL & SURGICAL HISTORY:  Arthritis: B/L knee pain in cold weather  Seizures: Last seizure was over 9 years ago  Diabetes  Elective surgery: Right upper arm injury 1994        Home Medications:  Janumet 50 mg-1000 mg oral tablet: 1 tab(s) orally 2 times a day (12 Mar 2019 16:05)  levETIRAcetam 750 mg oral tablet, extended release: 1 tab(s) orally once a day (at bedtime) (12 Mar 2019 16:05)  Moringa Green Tea: 1 drink orally once a day (12 Mar 2019 16:05)      MEDICATIONS  (STANDING):  dextrose 5%. 1000 milliLiter(s) (50 mL/Hr) IV Continuous <Continuous>  dextrose 50% Injectable 12.5 Gram(s) IV Push once  dextrose 50% Injectable 25 Gram(s) IV Push once  dextrose 50% Injectable 25 Gram(s) IV Push once  hydroxychloroquine 200 milliGRAM(s) Oral every 12 hours  hydroxychloroquine   Oral   insulin lispro (HumaLOG) corrective regimen sliding scale   SubCutaneous at bedtime  insulin lispro (HumaLOG) corrective regimen sliding scale   SubCutaneous at bedtime  levETIRAcetam 500 milliGRAM(s) Oral two times a day  pravastatin 80 milliGRAM(s) Oral at bedtime  sodium chloride 0.45%. 1000 milliLiter(s) (50 mL/Hr) IV Continuous <Continuous>    MEDICATIONS  (PRN):  acetaminophen   Tablet .. 650 milliGRAM(s) Oral every 4 hours PRN Temp greater or equal to 38.5C (101.3F)  dextrose 40% Gel 15 Gram(s) Oral once PRN Blood Glucose LESS THAN 70 milliGRAM(s)/deciliter  glucagon  Injectable 1 milliGRAM(s) IntraMuscular once PRN Glucose LESS THAN 70 milligrams/deciliter        Allergies    iodine containing compounds (Rash; Blisters; Pruritus)    Intolerances        SOCIAL HISTORY:  Denies ETOh,Smoking,     FAMILY HISTORY:      REVIEW OF SYSTEMS:    CONSTITUTIONAL: No weakness, fevers or chills  EYES/ENT: No visual changes;  No vertigo or throat pain   NECK: No pain or stiffness  RESPIRATORY: + sob and cough  CARDIOVASCULAR: No chest pain or palpitations  GASTROINTESTINAL: No abdominal or epigastric pain. No nausea, vomiting, or hematemesis; No diarrhea or constipation. No melena or hematochezia.  GENITOURINARY: No dysuria, frequency or hematuria  NEUROLOGICAL: No numbness or weakness  SKIN: No itching, burning, rashes, or lesions   All other review of systems is negative unless indicated above.    VITAL:  Vital Signs Last 24 Hrs  Vital Signs Last 24 Hrs  T(C): 36.5 (03 Apr 2020 18:04), Max: 39.2 (02 Apr 2020 21:05)  T(F): 97.7 (03 Apr 2020 18:04), Max: 102.5 (02 Apr 2020 21:05)  HR: 73 (03 Apr 2020 16:03) (73 - 96)  BP: 135/85 (03 Apr 2020 16:03) (131/74 - 139/72)  BP(mean): --  RR: 21 (03 Apr 2020 09:32) (20 - 22)  SpO2: 97% (03 Apr 2020 16:03) (95% - 97%)        PHYSICAL EXAM:    Constitutional: NAD  HEENT:  EOMI,  MMM  Neck: No LAD, No JVD  Respiratory: poor ae   Cardiovascular: S1 and S2  Gastrointestinal: BS+, soft, NT/ND  Extremities: No peripheral edema  Neurological: A/O x 3, no focal deficits  : No Gamble  Skin: No rashes  Access: Not applicable        136    |  104    |  56     ----------------------------<  128       03 Apr 2020 08:33  3.8     |  21     |  3.46     135    |  102    |  45     ----------------------------<  161       02 Apr 2020 08:56  3.9     |  23     |  3.47     133    |  102    |  40     ----------------------------<  169       01 Apr 2020 10:20  3.7     |  21     |  2.90     Ca    7.7        03 Apr 2020 08:33  Ca    7.6        02 Apr 2020 08:56    Phos  3.9       01 Apr 2020 10:20      TPro  5.9    /  Alb  1.5    /  TBili  0.3    /        03 Apr 2020 08:33  DBili  x      /  AST  33     /  ALT  21     /  AlkPhos  57       TPro  6.1    /  Alb  1.7    /  TBili  0.3    /        02 Apr 2020 08:56  DBili  x      /  AST  29     /  ALT  25     /  AlkPhos  59             TPro  6.1    /  Alb  1.7    /  TBili  0.3    /        02 Apr 2020 08:56  DBili  x      /  AST  29     /  ALT  25     /  AlkPhos  59       TPro  x      /  Alb  1.9    /  TBili  x      /        01 Apr 2020 10:20  DBili  x      /  AST  x      /  ALT  x      /  AlkPhos  x                         Urine Studies:          RADIOLOGY & ADDITIONAL STUDIES:

## 2020-04-04 ENCOUNTER — TRANSCRIPTION ENCOUNTER (OUTPATIENT)
Age: 60
End: 2020-04-04

## 2020-04-04 LAB
ALBUMIN SERPL ELPH-MCNC: 1.5 G/DL — LOW (ref 3.3–5)
ALP SERPL-CCNC: 59 U/L — SIGNIFICANT CHANGE UP (ref 40–120)
ALT FLD-CCNC: 28 U/L — SIGNIFICANT CHANGE UP (ref 12–78)
ANION GAP SERPL CALC-SCNC: 8 MMOL/L — SIGNIFICANT CHANGE UP (ref 5–17)
AST SERPL-CCNC: 44 U/L — HIGH (ref 15–37)
BASOPHILS # BLD AUTO: 0 K/UL — SIGNIFICANT CHANGE UP (ref 0–0.2)
BASOPHILS NFR BLD AUTO: 0 % — SIGNIFICANT CHANGE UP (ref 0–2)
BILIRUB SERPL-MCNC: 0.4 MG/DL — SIGNIFICANT CHANGE UP (ref 0.2–1.2)
BUN SERPL-MCNC: 67 MG/DL — HIGH (ref 7–23)
CALCIUM SERPL-MCNC: 8 MG/DL — LOW (ref 8.5–10.1)
CHLORIDE SERPL-SCNC: 106 MMOL/L — SIGNIFICANT CHANGE UP (ref 96–108)
CO2 SERPL-SCNC: 25 MMOL/L — SIGNIFICANT CHANGE UP (ref 22–31)
CREAT SERPL-MCNC: 3.32 MG/DL — HIGH (ref 0.5–1.3)
CRP SERPL-MCNC: 7.78 MG/DL — HIGH (ref 0–0.4)
EOSINOPHIL # BLD AUTO: 0.06 K/UL — SIGNIFICANT CHANGE UP (ref 0–0.5)
EOSINOPHIL NFR BLD AUTO: 1 % — SIGNIFICANT CHANGE UP (ref 0–6)
FERRITIN SERPL-MCNC: 527 NG/ML — HIGH (ref 30–400)
GLUCOSE SERPL-MCNC: 112 MG/DL — HIGH (ref 70–99)
HCT VFR BLD CALC: 41.7 % — SIGNIFICANT CHANGE UP (ref 39–50)
HGB BLD-MCNC: 13.9 G/DL — SIGNIFICANT CHANGE UP (ref 13–17)
KAPPA LC SER QL IFE: 17.67 MG/DL — HIGH (ref 0.33–1.94)
KAPPA/LAMBDA FREE LIGHT CHAIN RATIO, SERUM: 1.25 RATIO — SIGNIFICANT CHANGE UP (ref 0.26–1.65)
LAMBDA LC SER QL IFE: 14.15 MG/DL — HIGH (ref 0.57–2.63)
LYMPHOCYTES # BLD AUTO: 1.28 K/UL — SIGNIFICANT CHANGE UP (ref 1–3.3)
LYMPHOCYTES # BLD AUTO: 23 % — SIGNIFICANT CHANGE UP (ref 13–44)
MCHC RBC-ENTMCNC: 27.1 PG — SIGNIFICANT CHANGE UP (ref 27–34)
MCHC RBC-ENTMCNC: 33.3 GM/DL — SIGNIFICANT CHANGE UP (ref 32–36)
MCV RBC AUTO: 81.3 FL — SIGNIFICANT CHANGE UP (ref 80–100)
MONOCYTES # BLD AUTO: 0.67 K/UL — SIGNIFICANT CHANGE UP (ref 0–0.9)
MONOCYTES NFR BLD AUTO: 12 % — SIGNIFICANT CHANGE UP (ref 2–14)
NEUTROPHILS # BLD AUTO: 3.57 K/UL — SIGNIFICANT CHANGE UP (ref 1.8–7.4)
NEUTROPHILS NFR BLD AUTO: 64 % — SIGNIFICANT CHANGE UP (ref 43–77)
NRBC # BLD: SIGNIFICANT CHANGE UP /100 WBCS (ref 0–0)
PLATELET # BLD AUTO: 263 K/UL — SIGNIFICANT CHANGE UP (ref 150–400)
POTASSIUM SERPL-MCNC: 3.8 MMOL/L — SIGNIFICANT CHANGE UP (ref 3.5–5.3)
POTASSIUM SERPL-SCNC: 3.8 MMOL/L — SIGNIFICANT CHANGE UP (ref 3.5–5.3)
PROCALCITONIN SERPL-MCNC: 0.9 NG/ML — HIGH (ref 0.02–0.1)
PROT SERPL-MCNC: 5.2 G/DL — LOW (ref 6–8.3)
PROT SERPL-MCNC: 5.2 G/DL — LOW (ref 6–8.3)
PROT SERPL-MCNC: 6.1 GM/DL — SIGNIFICANT CHANGE UP (ref 6–8.3)
RBC # BLD: 5.13 M/UL — SIGNIFICANT CHANGE UP (ref 4.2–5.8)
RBC # FLD: 13.3 % — SIGNIFICANT CHANGE UP (ref 10.3–14.5)
SODIUM SERPL-SCNC: 139 MMOL/L — SIGNIFICANT CHANGE UP (ref 135–145)
WBC # BLD: 5.58 K/UL — SIGNIFICANT CHANGE UP (ref 3.8–10.5)
WBC # FLD AUTO: 5.58 K/UL — SIGNIFICANT CHANGE UP (ref 3.8–10.5)

## 2020-04-04 PROCEDURE — 99233 SBSQ HOSP IP/OBS HIGH 50: CPT

## 2020-04-04 RX ORDER — CEFTRIAXONE 500 MG/1
INJECTION, POWDER, FOR SOLUTION INTRAMUSCULAR; INTRAVENOUS
Refills: 0 | Status: DISCONTINUED | OUTPATIENT
Start: 2020-04-04 | End: 2020-04-04

## 2020-04-04 RX ORDER — CEFTRIAXONE 500 MG/1
1000 INJECTION, POWDER, FOR SOLUTION INTRAMUSCULAR; INTRAVENOUS EVERY 24 HOURS
Refills: 0 | Status: DISCONTINUED | OUTPATIENT
Start: 2020-04-05 | End: 2020-04-05

## 2020-04-04 RX ORDER — CEFTRIAXONE 500 MG/1
INJECTION, POWDER, FOR SOLUTION INTRAMUSCULAR; INTRAVENOUS
Refills: 0 | Status: DISCONTINUED | OUTPATIENT
Start: 2020-04-04 | End: 2020-04-05

## 2020-04-04 RX ORDER — CEFTRIAXONE 500 MG/1
1000 INJECTION, POWDER, FOR SOLUTION INTRAMUSCULAR; INTRAVENOUS ONCE
Refills: 0 | Status: COMPLETED | OUTPATIENT
Start: 2020-04-04 | End: 2020-04-04

## 2020-04-04 RX ADMIN — Medication 200 MILLIGRAM(S): at 11:40

## 2020-04-04 RX ADMIN — CEFTRIAXONE 1000 MILLIGRAM(S): 500 INJECTION, POWDER, FOR SOLUTION INTRAMUSCULAR; INTRAVENOUS at 18:04

## 2020-04-04 RX ADMIN — LEVETIRACETAM 500 MILLIGRAM(S): 250 TABLET, FILM COATED ORAL at 11:39

## 2020-04-04 RX ADMIN — Medication 200 MILLIGRAM(S): at 22:12

## 2020-04-04 RX ADMIN — Medication 80 MILLIGRAM(S): at 22:12

## 2020-04-04 RX ADMIN — LEVETIRACETAM 500 MILLIGRAM(S): 250 TABLET, FILM COATED ORAL at 22:12

## 2020-04-04 NOTE — DISCHARGE NOTE PROVIDER - NSDCMRMEDTOKEN_GEN_ALL_CORE_FT
Janumet 50 mg-1000 mg oral tablet: 1 tab(s) orally 2 times a day  levETIRAcetam 750 mg oral tablet, extended release: 1 tab(s) orally once a day (at bedtime)  Moringa Green Tea: 1 drink orally once a day cefuroxime 500 mg oral tablet: 1 tab(s) orally 2 times a day   Januvia 25 mg oral tablet: 1 tab(s) orally once a day   levETIRAcetam 750 mg oral tablet, extended release: 1 tab(s) orally once a day (at bedtime)  Moringa Green Tea: 1 drink orally once a day

## 2020-04-04 NOTE — DISCHARGE NOTE PROVIDER - CARE PROVIDER_API CALL
Julienne Barreto)  Medicine  [address], [address]  [place] [address]  Phone: [phone]  Fax: [phone]  Follow Up Time: 1 week    Kevin Calloway)  Internal Medicine  [address] [place] [address]  Phone: [phone]  Fax: [phone]  Follow Up Time: 1 week

## 2020-04-04 NOTE — PROGRESS NOTE ADULT - SUBJECTIVE AND OBJECTIVE BOX
58 yo male with h/o seizure d/o, DM c/o 7 days of feeling unwell.  +fatigue, fevers/chills, decreased PO intake, sob, coughing.  Last took tylenol at midnight. + diarrhea x 2 days.  No chest pain, abdominal pain, n/v.  No known sick contacts.  Nonsmoker.  Did not take his diabetes meds today.    renal eval called for rising Cr  COVID +     last Creatinine 1.2 in march 2019      no new events   feeling weak    no sob    lying in bed         PAST MEDICAL & SURGICAL HISTORY:  Arthritis: B/L knee pain in cold weather  Seizures: Last seizure was over 9 years ago  Diabetes  Elective surgery: Right upper arm injury 1994        Home Medications:  Janumet 50 mg-1000 mg oral tablet: 1 tab(s) orally 2 times a day (12 Mar 2019 16:05)  levETIRAcetam 750 mg oral tablet, extended release: 1 tab(s) orally once a day (at bedtime) (12 Mar 2019 16:05)  Moringa Green Tea: 1 drink orally once a day (12 Mar 2019 16:05)        MEDICATIONS  (STANDING):  dextrose 5%. 1000 milliLiter(s) (50 mL/Hr) IV Continuous <Continuous>  dextrose 50% Injectable 12.5 Gram(s) IV Push once  dextrose 50% Injectable 25 Gram(s) IV Push once  dextrose 50% Injectable 25 Gram(s) IV Push once  hydroxychloroquine 200 milliGRAM(s) Oral every 12 hours  hydroxychloroquine   Oral   insulin lispro (HumaLOG) corrective regimen sliding scale   SubCutaneous at bedtime  insulin lispro (HumaLOG) corrective regimen sliding scale   SubCutaneous at bedtime  levETIRAcetam 500 milliGRAM(s) Oral two times a day  pravastatin 80 milliGRAM(s) Oral at bedtime  sodium chloride 0.45%. 1000 milliLiter(s) (50 mL/Hr) IV Continuous <Continuous>    MEDICATIONS  (PRN):  acetaminophen   Tablet .. 650 milliGRAM(s) Oral every 4 hours PRN Temp greater or equal to 38.5C (101.3F)  dextrose 40% Gel 15 Gram(s) Oral once PRN Blood Glucose LESS THAN 70 milliGRAM(s)/deciliter  glucagon  Injectable 1 milliGRAM(s) IntraMuscular once PRN Glucose LESS THAN 70 milligrams/deciliter      Allergies    iodine containing compounds (Rash; Blisters; Pruritus)    Intolerances        SOCIAL HISTORY:  Denies ETOh,Smoking,     FAMILY HISTORY:      REVIEW OF SYSTEMS:    CONSTITUTIONAL: No weakness, fevers or chills  EYES/ENT: No visual changes;  No vertigo or throat pain   NECK: No pain or stiffness  RESPIRATORY: + sob and cough  CARDIOVASCULAR: No chest pain or palpitations  GASTROINTESTINAL: No abdominal or epigastric pain. No nausea, vomiting, or hematemesis; No diarrhea or constipation. No melena or hematochezia.  GENITOURINARY: No dysuria, frequency or hematuria  NEUROLOGICAL: No numbness or weakness  SKIN: No itching, burning, rashes, or lesions   All other review of systems is negative unless indicated above.    ICU Vital Signs Last 24 Hrs  T(C): 36.3 (04 Apr 2020 11:38), Max: 36.6 (03 Apr 2020 21:02)  T(F): 97.4 (04 Apr 2020 11:38), Max: 97.9 (03 Apr 2020 21:02)  HR: 71 (04 Apr 2020 11:38) (71 - 73)  BP: 139/76 (04 Apr 2020 11:38) (135/85 - 139/76)  BP(mean): --  ABP: --  ABP(mean): --  RR: 21 (04 Apr 2020 11:38) (20 - 22)  SpO2: 98% (04 Apr 2020 11:38) (94% - 98%)    I&O's Detail      PHYSICAL EXAM:    Constitutional: NAD  HEENT:  EOMI,  MMM  Neck: No LAD, No JVD  Respiratory: poor ae   Cardiovascular: S1 and S2  Gastrointestinal: BS+, soft, NT/ND  Extremities: No peripheral edema  Neurological: A/O x 3, no focal deficits  : No Gamble  Skin: No rashes  Access: Not applicable                          13.9   5.58  )-----------( 263      ( 04 Apr 2020 09:15 )             41.7                         14.2   7.68  )-----------( 226      ( 03 Apr 2020 08:33 )             41.6       139    |  106    |  67     ----------------------------<  112       04 Apr 2020 09:15  3.8     |  25     |  3.32     136    |  104    |  56     ----------------------------<  128       03 Apr 2020 08:33  3.8     |  21     |  3.46     135    |  102    |  45     ----------------------------<  161       02 Apr 2020 08:56  3.9     |  23     |  3.47     Ca    8.0        04 Apr 2020 09:15  Ca    7.7        03 Apr 2020 08:33    Phos  3.9       01 Apr 2020 10:20         Urine Studies:          RADIOLOGY & ADDITIONAL STUDIES:

## 2020-04-04 NOTE — PROGRESS NOTE ADULT - SUBJECTIVE AND OBJECTIVE BOX
HPI:    58 yo male with h/o seizure d/o, DM c/o 7 days of feeling unwell.  +fatigue, fevers/chills, decreased PO intake, sob, coughing.  Last took tylenol at midnight. + diarrhea x 2 days.  No chest pain, abdominal pain, n/v.  No known sick contacts.  Nonsmoker.  Did not take his diabetes meds today.      SUBJECTIVE:     Patient seen and examined this morning, saturating 94% on 2L NC.    Discussed case with Dr. Calloway and d/c planning -- pt to stop any home nephrotoxic agents.   Pt will need home O2 given patient still on nasal cannula. Social work pending clearance for supplemental O2.     REVIEW OF SYSTEMS:        Vital Signs Last 24 Hrs  T(C): 36.2 (04 Apr 2020 15:24), Max: 36.6 (03 Apr 2020 21:02)  T(F): 97.2 (04 Apr 2020 15:24), Max: 97.9 (03 Apr 2020 21:02)  HR: 76 (04 Apr 2020 15:24) (71 - 76)  BP: 135/83 (04 Apr 2020 15:24) (135/83 - 139/76)  BP(mean): --  RR: 20 (04 Apr 2020 15:24) (20 - 22)  SpO2: 96% (04 Apr 2020 15:25) (93% - 98%)    I&O's Summary      CAPILLARY BLOOD GLUCOSE      POCT Blood Glucose.: 119 mg/dL (03 Apr 2020 22:55)      PHYSICAL EXAM:        MEDICATIONS:  MEDICATIONS  (STANDING):  dextrose 5%. 1000 milliLiter(s) (50 mL/Hr) IV Continuous <Continuous>  dextrose 50% Injectable 12.5 Gram(s) IV Push once  dextrose 50% Injectable 25 Gram(s) IV Push once  dextrose 50% Injectable 25 Gram(s) IV Push once  hydroxychloroquine 200 milliGRAM(s) Oral every 12 hours  hydroxychloroquine   Oral   insulin lispro (HumaLOG) corrective regimen sliding scale   SubCutaneous at bedtime  insulin lispro (HumaLOG) corrective regimen sliding scale   SubCutaneous at bedtime  levETIRAcetam 500 milliGRAM(s) Oral two times a day  pravastatin 80 milliGRAM(s) Oral at bedtime  sodium chloride 0.45%. 1000 milliLiter(s) (50 mL/Hr) IV Continuous <Continuous>      LABS: All Labs Reviewed:                        13.9   5.58  )-----------( 263      ( 04 Apr 2020 09:15 )             41.7     04-04    139  |  106  |  67<H>  ----------------------------<  112<H>  3.8   |  25  |  3.32<H>    Ca    8.0<L>      04 Apr 2020 09:15    TPro  6.1  /  Alb  1.5<L>  /  TBili  0.4  /  DBili  x   /  AST  44<H>  /  ALT  28  /  AlkPhos  59  04-04          Blood Culture: 03-31 @ 23:33  Organism --  Gram Stain Blood -- Gram Stain --  Specimen Source .Blood Blood-Peripheral  Culture-Blood --        RADIOLOGY/EKG:    DVT PPX:    ADVANCED DIRECTIVE:    DISPOSITION: HPI:    58 yo male with h/o seizure d/o, DM c/o 7 days of feeling unwell.  +fatigue, fevers/chills, decreased PO intake, sob, coughing. Last took tylenol at midnight. + diarrhea x 2 days. No chest pain, abdominal pain, n/v.  No known sick contacts.    SUBJECTIVE:     Patient seen and examined this morning, saturating 94% on 2L NC.    Discussed case with Dr. Calloway and d/c planning -- pt to stop any home nephrotoxic agents.   Pt will need home O2 given patient still on nasal cannula. Social work pending clearance for supplemental O2.     REVIEW OF SYSTEMS:    CONSTITUTIONAL: No weakness, fevers or chills  RESPIRATORY: No cough, wheezing, hemoptysis; No shortness of breath,   CARDIOVASCULAR: No chest pain or palpitations  GASTROINTESTINAL: No abdominal or epigastric pain. No nausea, vomiting, or hematemesis; No diarrhea or constipation.   SKIN: No itching, burning, rashes, or lesions   All other review of systems is negative unless indicated above      Vital Signs Last 24 Hrs  T(C): 36.2 (04 Apr 2020 15:24), Max: 36.6 (03 Apr 2020 21:02)  T(F): 97.2 (04 Apr 2020 15:24), Max: 97.9 (03 Apr 2020 21:02)  HR: 76 (04 Apr 2020 15:24) (71 - 76)  BP: 135/83 (04 Apr 2020 15:24) (135/83 - 139/76)  BP(mean): --  RR: 20 (04 Apr 2020 15:24) (20 - 22)  SpO2: 96% (04 Apr 2020 15:25) (93% - 98%)    I&O's Summary      CAPILLARY BLOOD GLUCOSE      POCT Blood Glucose.: 119 mg/dL (03 Apr 2020 22:55)      PHYSICAL EXAM:  General: NAD, well appearing  CV: +S1/S2, no m/r/g  Lungs: + minimal lower lung crackles  Vascular: pulses 2+ b/l  Extremities: no peripheral edema      MEDICATIONS:  MEDICATIONS  (STANDING):  dextrose 5%. 1000 milliLiter(s) (50 mL/Hr) IV Continuous <Continuous>  dextrose 50% Injectable 12.5 Gram(s) IV Push once  dextrose 50% Injectable 25 Gram(s) IV Push once  dextrose 50% Injectable 25 Gram(s) IV Push once  hydroxychloroquine 200 milliGRAM(s) Oral every 12 hours  hydroxychloroquine   Oral   insulin lispro (HumaLOG) corrective regimen sliding scale   SubCutaneous at bedtime  insulin lispro (HumaLOG) corrective regimen sliding scale   SubCutaneous at bedtime  levETIRAcetam 500 milliGRAM(s) Oral two times a day  pravastatin 80 milliGRAM(s) Oral at bedtime  sodium chloride 0.45%. 1000 milliLiter(s) (50 mL/Hr) IV Continuous <Continuous>      LABS: All Labs Reviewed:                        13.9   5.58  )-----------( 263      ( 04 Apr 2020 09:15 )             41.7     04-04    139  |  106  |  67<H>  ----------------------------<  112<H>  3.8   |  25  |  3.32<H>    Ca    8.0<L>      04 Apr 2020 09:15    TPro  6.1  /  Alb  1.5<L>  /  TBili  0.4  /  DBili  x   /  AST  44<H>  /  ALT  28  /  AlkPhos  59  04-04          Blood Culture: 03-31 @ 23:33  Organism --  Gram Stain Blood -- Gram Stain --  Specimen Source .Blood Blood-Peripheral  Culture-Blood --        RADIOLOGY/EKG:      EXAM:  US KIDNEYS AND BLADDER                            PROCEDURE DATE:  04/01/2020          IMPRESSION:     Increased echogenicity of the kidneys, suggestive of medical renal disease.         EXAM:  XR CHEST PORTABLE IMMED 1V                            PROCEDURE DATE:  03/31/2020          INTERPRETATION:  PORTABLE CHEST X-RAY    HISTORY: Shortness of Breath, Cough,  Fever.    COMPARISON: None.    FINDINGS:  Moderate patchy bilateral airspace opacities.  No pneumothorax or pleural effusion.   The cardiac silhouette is not accurately assessed by AP technique.    IMPRESSION:  Moderate patchy bilateral airspace opacities, probably infectious from typical or atypical agents, including viral pneumonia.

## 2020-04-04 NOTE — DISCHARGE NOTE PROVIDER - HOSPITAL COURSE
60 yo male with h/o seizure d/o, DM c/o 7 days of feeling unwell. Symptoms included +fatigue, fevers/chills, decreased PO intake, sob, coughing, diarrhea x 2 days. Pt was admitted to [facility] on 3/31/2020 and tested positive for COVID-19 on 3/31/2020. CXR showed moderate patchy b/l airspace opacities. Patient was treated with Plaquenil 200 mg q12h x 4 days and IV fluids.    On admission Cr was elevated to 3.02. US kidney and bladder was done due to WILLIAMS and showed increased  echogenicity of the kidneys, suggestive of medical renal disease. Nephrology consulted and nephrotoxic agents were avoided as possible. Urine studies showed protein 1 g and elevated protein/creatinine ratio- 6.1, likely due to Diabetes.     Patient is medically stable and cleared for discharge from [facility] on 4/4/2020.        SUBJECTIVE:         Patient seen and examined this morning. Denied acute complaints. No SOB, no CP.        REVIEW OF SYSTEMS:        CONSTITUTIONAL: No weakness, fevers or chills    EYES/ENT: No visual changes;  No vertigo or throat pain     NECK: No pain or stiffness    RESPIRATORY: No cough, wheezing, hemoptysis; No shortness of breath    CARDIOVASCULAR: No chest pain or palpitations    GASTROINTESTINAL: No abdominal or epigastric pain. No nausea, vomiting, or hematemesis; No diarrhea or constipation. No melena or hematochezia.    GENITOURINARY: No dysuria, frequency or hematuria    NEUROLOGICAL: No numbness or weakness    SKIN: No itching, burning, rashes, or lesions     All other review of systems is negative unless indicated above        Vital Signs Last 24 Hrs    T(C): 36.3 (04 Apr 2020 11:38), Max: 36.6 (03 Apr 2020 21:02)    T(F): 97.4 (04 Apr 2020 11:38), Max: 97.9 (03 Apr 2020 21:02)    HR: 71 (04 Apr 2020 11:38) (71 - 73)    BP: 139/76 (04 Apr 2020 11:38) (135/85 - 139/76)    BP(mean): --    RR: 21 (04 Apr 2020 11:38) (20 - 22)    SpO2: 98% (04 Apr 2020 11:38) (94% - 98%)        I&O's Summary            CAPILLARY BLOOD GLUCOSE            POCT Blood Glucose.: 119 mg/dL (03 Apr 2020 22:55)            PHYSICAL EXAM:        Constitutional: NAD, awake and alert, well-developed    HEENT: PERR, EOMI, Normal Hearing, MMM    Neck: Soft and supple, No LAD, No JVD    Respiratory: + lower lobe crackles     Cardiovascular: S1 and S2, regular rate and rhythm, no Murmurs, gallops or rubs    Gastrointestinal: Bowel Sounds present, soft, nontender, nondistended, no guarding, no rebound    Extremities: No peripheral edema    Vascular: 2+ peripheral pulses    Neurological: A/O x 3, no focal deficits    Musculoskeletal: 5/5 strength b/l upper and lower extremities    Skin: No rashes        MEDICATIONS:    MEDICATIONS  (STANDING):    dextrose 5%. 1000 milliLiter(s) (50 mL/Hr) IV Continuous <Continuous>    dextrose 50% Injectable 12.5 Gram(s) IV Push once    dextrose 50% Injectable 25 Gram(s) IV Push once    dextrose 50% Injectable 25 Gram(s) IV Push once    hydroxychloroquine 200 milliGRAM(s) Oral every 12 hours    hydroxychloroquine   Oral     insulin lispro (HumaLOG) corrective regimen sliding scale   SubCutaneous at bedtime    insulin lispro (HumaLOG) corrective regimen sliding scale   SubCutaneous at bedtime    levETIRAcetam 500 milliGRAM(s) Oral two times a day    pravastatin 80 milliGRAM(s) Oral at bedtime    sodium chloride 0.45%. 1000 milliLiter(s) (50 mL/Hr) IV Continuous <Continuous>            LABS: All Labs Reviewed:                            13.9     5.58  )-----------( 263      ( 04 Apr 2020 09:15 )               41.7         04-04        139  |  106  |  67<H>    ----------------------------<  112<H>    3.8   |  25  |  3.32<H>        Ca    8.0<L>      04 Apr 2020 09:15        TPro  6.1  /  Alb  1.5<L>  /  TBili  0.4  /  DBili  x   /  AST  44<H>  /  ALT  28  /  AlkPhos  59  04-04 60 yo male with h/o seizure d/o, DM c/o 7 days of feeling unwell. Symptoms included +fatigue, fevers/chills, decreased PO intake, sob, coughing, diarrhea x 2 days. Pt was admitted to [facility] on 3/31/2020 and tested positive for COVID-19 on 3/31/2020. CXR showed moderate patchy b/l airspace opacities. Patient was treated with Plaquenil 200 mg q12h x 5 days, Ceftriaxone 1 g QD (4/4- ) and IV fluids.    On admission Cr was elevated to 3.02. US kidney and bladder was done due to WILLIAMS and showed increased  echogenicity of the kidneys, suggestive of medical renal disease. Nephrology consulted and nephrotoxic agents were avoided as possible. Urine studies showed protein 1 g and elevated protein/creatinine ratio- 6.1, likely due to Diabetes.     Patient is medically stable and cleared for discharge from [facility] on 4/5/2020.         SUBJECTIVE:         Patient seen and examined this morning. Denied acute complaints. No SOB, no CP.        REVIEW OF SYSTEMS:        CONSTITUTIONAL: No weakness, fevers or chills    EYES/ENT: No visual changes;  No vertigo or throat pain     NECK: No pain or stiffness    RESPIRATORY: No cough, wheezing, hemoptysis; No shortness of breath    CARDIOVASCULAR: No chest pain or palpitations    GASTROINTESTINAL: No abdominal or epigastric pain. No nausea, vomiting, or hematemesis; No diarrhea or constipation. No melena or hematochezia.    GENITOURINARY: No dysuria, frequency or hematuria    NEUROLOGICAL: No numbness or weakness    SKIN: No itching, burning, rashes, or lesions     All other review of systems is negative unless indicated above        Vital Signs Last 24 Hrs    T(C): 36.3 (04 Apr 2020 11:38), Max: 36.6 (03 Apr 2020 21:02)    T(F): 97.4 (04 Apr 2020 11:38), Max: 97.9 (03 Apr 2020 21:02)    HR: 71 (04 Apr 2020 11:38) (71 - 73)    BP: 139/76 (04 Apr 2020 11:38) (135/85 - 139/76)    BP(mean): --    RR: 21 (04 Apr 2020 11:38) (20 - 22)    SpO2: 98% (04 Apr 2020 11:38) (94% - 98%)        I&O's Summary            CAPILLARY BLOOD GLUCOSE            POCT Blood Glucose.: 119 mg/dL (03 Apr 2020 22:55)            PHYSICAL EXAM:        Constitutional: NAD, awake and alert, well-developed    HEENT: PERR, EOMI, Normal Hearing, MMM    Neck: Soft and supple, No LAD, No JVD    Respiratory: + lower lobe crackles     Cardiovascular: S1 and S2, regular rate and rhythm, no Murmurs, gallops or rubs    Gastrointestinal: Bowel Sounds present, soft, nontender, nondistended, no guarding, no rebound    Extremities: No peripheral edema    Vascular: 2+ peripheral pulses    Neurological: A/O x 3, no focal deficits    Musculoskeletal: 5/5 strength b/l upper and lower extremities    Skin: No rashes        MEDICATIONS:    MEDICATIONS  (STANDING):    dextrose 5%. 1000 milliLiter(s) (50 mL/Hr) IV Continuous <Continuous>    dextrose 50% Injectable 12.5 Gram(s) IV Push once    dextrose 50% Injectable 25 Gram(s) IV Push once    dextrose 50% Injectable 25 Gram(s) IV Push once    hydroxychloroquine 200 milliGRAM(s) Oral every 12 hours    hydroxychloroquine   Oral     insulin lispro (HumaLOG) corrective regimen sliding scale   SubCutaneous at bedtime    insulin lispro (HumaLOG) corrective regimen sliding scale   SubCutaneous at bedtime    levETIRAcetam 500 milliGRAM(s) Oral two times a day    pravastatin 80 milliGRAM(s) Oral at bedtime    sodium chloride 0.45%. 1000 milliLiter(s) (50 mL/Hr) IV Continuous <Continuous>            LABS: All Labs Reviewed:                            13.9     5.58  )-----------( 263      ( 04 Apr 2020 09:15 )               41.7         04-04        139  |  106  |  67<H>    ----------------------------<  112<H>    3.8   |  25  |  3.32<H>        Ca    8.0<L>      04 Apr 2020 09:15        TPro  6.1  /  Alb  1.5<L>  /  TBili  0.4  /  DBili  x   /  AST  44<H>  /  ALT  28  /  AlkPhos  59  04-04 58 yo male with h/o seizure d/o, DM c/o 7 days of feeling unwell. Symptoms included +fatigue, fevers/chills, decreased PO intake, sob, coughing, diarrhea x 2 days. Pt was admitted to [facility] on 3/31/2020 and tested positive for COVID-19 on 3/31/2020. CXR showed moderate patchy b/l airspace opacities. Patient was treated with Plaquenil 200 mg q12h x 5 days, Ceftriaxone 1 g QD (4/4- ) and IV fluids.    On admission Cr was elevated to 3.02. US kidney and bladder was done due to WILLIASM and showed increased  echogenicity of the kidneys, suggestive of medical renal disease. Nephrology consulted and nephrotoxic agents were avoided as possible. Urine studies showed protein 1 g and elevated protein/creatinine ratio- 6.1, likely due to Diabetes.     Patient is medically stable and cleared for discharge from [facility] on 4/5/2020.     Patient has acute diagnosis of COVID 19 and requires home O2 to increase hospital capacity and mitigate risk.        SUBJECTIVE:         Patient seen and examined this morning. Denied acute complaints. No SOB, no CP.        REVIEW OF SYSTEMS:        CONSTITUTIONAL: No weakness, fevers or chills    EYES/ENT: No visual changes;  No vertigo or throat pain     NECK: No pain or stiffness    RESPIRATORY: No cough, wheezing, hemoptysis; No shortness of breath    CARDIOVASCULAR: No chest pain or palpitations    GASTROINTESTINAL: No abdominal or epigastric pain. No nausea, vomiting, or hematemesis; No diarrhea or constipation. No melena or hematochezia.    GENITOURINARY: No dysuria, frequency or hematuria    NEUROLOGICAL: No numbness or weakness    SKIN: No itching, burning, rashes, or lesions     All other review of systems is negative unless indicated above        Vital Signs Last 24 Hrs    T(C): 36.3 (04 Apr 2020 11:38), Max: 36.6 (03 Apr 2020 21:02)    T(F): 97.4 (04 Apr 2020 11:38), Max: 97.9 (03 Apr 2020 21:02)    HR: 71 (04 Apr 2020 11:38) (71 - 73)    BP: 139/76 (04 Apr 2020 11:38) (135/85 - 139/76)    BP(mean): --    RR: 21 (04 Apr 2020 11:38) (20 - 22)    SpO2: 98% (04 Apr 2020 11:38) (94% - 98%)        I&O's Summary            CAPILLARY BLOOD GLUCOSE            POCT Blood Glucose.: 119 mg/dL (03 Apr 2020 22:55)            PHYSICAL EXAM:        Constitutional: NAD, awake and alert, well-developed    HEENT: PERR, EOMI, Normal Hearing, MMM    Neck: Soft and supple, No LAD, No JVD    Respiratory: + lower lobe crackles     Cardiovascular: S1 and S2, regular rate and rhythm, no Murmurs, gallops or rubs    Gastrointestinal: Bowel Sounds present, soft, nontender, nondistended, no guarding, no rebound    Extremities: No peripheral edema    Vascular: 2+ peripheral pulses    Neurological: A/O x 3, no focal deficits    Musculoskeletal: 5/5 strength b/l upper and lower extremities    Skin: No rashes        MEDICATIONS:    MEDICATIONS  (STANDING):    dextrose 5%. 1000 milliLiter(s) (50 mL/Hr) IV Continuous <Continuous>    dextrose 50% Injectable 12.5 Gram(s) IV Push once    dextrose 50% Injectable 25 Gram(s) IV Push once    dextrose 50% Injectable 25 Gram(s) IV Push once    hydroxychloroquine 200 milliGRAM(s) Oral every 12 hours    hydroxychloroquine   Oral     insulin lispro (HumaLOG) corrective regimen sliding scale   SubCutaneous at bedtime    insulin lispro (HumaLOG) corrective regimen sliding scale   SubCutaneous at bedtime    levETIRAcetam 500 milliGRAM(s) Oral two times a day    pravastatin 80 milliGRAM(s) Oral at bedtime    sodium chloride 0.45%. 1000 milliLiter(s) (50 mL/Hr) IV Continuous <Continuous>            LABS: All Labs Reviewed:                            13.9     5.58  )-----------( 263      ( 04 Apr 2020 09:15 )               41.7         04-04        139  |  106  |  67<H>    ----------------------------<  112<H>    3.8   |  25  |  3.32<H>        Ca    8.0<L>      04 Apr 2020 09:15        TPro  6.1  /  Alb  1.5<L>  /  TBili  0.4  /  DBili  x   /  AST  44<H>  /  ALT  28  /  AlkPhos  59  04-04        EXAM:  US KIDNEYS AND BLADDER                                  PROCEDURE DATE:  04/01/2020          IMPRESSION:         Increased echogenicity of the kidneys, suggestive of medical renal disease. .        CXR    IMPRESSION:    Moderate patchy bilateral airspace opacities, probably infectious from typical or atypical agents, including viral pneumonia.

## 2020-04-04 NOTE — PROGRESS NOTE ADULT - ASSESSMENT
60 yo male w hx of seizures, DM, fatigue, and COVID + , resp distress, fevers and now rising WILLIAMS     WILLIAMS in setting of COVID /pNA     Cr hopefully platuead   Cr baseline 1.2 in 2019     urine protein ~ 6 grams !!- sec to DM    no hematuria    check SPEP, IF, FLC pending   iurine NA < 20 -  enourage po    IF DC home and enourage po fluids    no NSAID    no Metformin with elevated Cr   if DC home needs to have labs in 2 weeks and fup with PCP or our office after 2 weeks home isolation - pt advised    daily labs   no ACE or ARB till Cr stabilizes       Viral pneumonia  - plan per Medicine / Plaquenil  - on NC 2 L sating 95%  - monitor COVID markers    d/w Medicine resident and RN

## 2020-04-04 NOTE — DISCHARGE NOTE PROVIDER - NSDCCPCAREPLAN_GEN_ALL_CORE_FT
PRINCIPAL DISCHARGE DIAGNOSIS  Diagnosis: Viral pneumonia  Assessment and Plan of Treatment: Treated with Plaquenil  You tested positive for COVID 19 on 3.31.2020  Self quaratine for 14 days. That means dont leave the house  Hydrate and get plenty of rest and take your vitamins  Wash your hands, before and after meals, before and after touching surfaces  Use separate bathroom from other family members  Wear mask and cover your cough with a tissue  if very short of breath, return to the Emergency department  You will need oxygen at home to help your lungs recover         SECONDARY DISCHARGE DIAGNOSES  Diagnosis: Renal insufficiency  Assessment and Plan of Treatment: You have kidney disease due to your diabetes and protein in your urine as well.  Your creatinine is elevated at 3 currently   Follow up with your primary doctor   Follow up with nephrologist as an outpatient -- monitor BUN/Cr  Avoid medications that can damage your kidneys such as NSAIDS (ex: ibuprofen, alleve)    Diagnosis: Hyponatremia  Assessment and Plan of Treatment: Resolved with Fluids    Diagnosis: Diabetes  Assessment and Plan of Treatment: Continue home medications    Diagnosis: Seizures  Assessment and Plan of Treatment: Continue home medications PRINCIPAL DISCHARGE DIAGNOSIS  Diagnosis: Viral pneumonia  Assessment and Plan of Treatment: Treated with Plaquenil  You tested positive for COVID 19 on 3.31.2020  Self quaratine for 14 days. That means dont leave the house  Hydrate and get plenty of rest and take your vitamins  Wash your hands, before and after meals, before and after touching surfaces  Use separate bathroom from other family members  Wear mask and cover your cough with a tissue  if very short of breath, return to the Emergency department  You will need oxygen at home to help your lungs recover         SECONDARY DISCHARGE DIAGNOSES  Diagnosis: Renal insufficiency  Assessment and Plan of Treatment: You have kidney disease due to your diabetes and protein in your urine as well.  Your creatinine is elevated at 3 currently   You need to drink plenty of water to help your kidney function improve  Follow up with your primary doctor   Follow up with nephrologist as an outpatient -- monitor BUN/Cr  Avoid medications that can damage your kidneys such as NSAIDS (ex: ibuprofen, alleve)    Diagnosis: Hyponatremia  Assessment and Plan of Treatment: Resolved with Fluids    Diagnosis: Diabetes  Assessment and Plan of Treatment: Do not continue taking metformin due to your kidney disease  Take januvia as prescribed    Diagnosis: Seizures  Assessment and Plan of Treatment: Continue home medications

## 2020-04-04 NOTE — PROGRESS NOTE ADULT - ASSESSMENT
60 yo male w hx of seizures, DM, fatigue, and COVID + , resp distress, fevers and now rising WILLIAMS     #Viral Pneumonia  - COVID positive  - Continue with Plaquenil 200 mg q12h  - Saturating 94% on 2L NC  - Continue to monitor clinically      #WILLIAMS on CKD  -Cr remains elevated   - Urine protein/cr- 6.1, Urine protein 1000 mg  - Acute on chronic renal disease, CKD secondary to DM2 likely  - unknown baseline kidney function  - Nephro following, Dr. Calloway, recs appreciated , continue IVFs,  -Avoid nephrotoxic agents    #Seizure Disorder  - continue with home med: Keppra 500 mg BID    #DM2  - BGM QHS  - ISS   - DM diet    #Hyponatremia  - Improved s/p IVF  - Monitor BMP QD      #Advanced Directives  -Trial on room air today  -D/C planning-- will likely need home O2 60 yo male w hx of seizures, DM, fatigue, and COVID + , resp distress, fevers and now rising WILLIAMS     #Viral Pneumonia 2/2 COVID19  - COVID positive  - Continue with Plaquenil 200 mg q12h  - Saturating 94% on 2L NC  - Continue to monitor clinically  -Monitor O2 sat  -Elevated procal .9-- ceftriaxone started (concern for bacterial component)      #WILLIAMS on CKD  -Cr remains elevated   - Urine protein/cr- 6.1, Urine protein 1000 mg  - Acute on chronic renal disease, CKD secondary to DM2 likely  - unknown baseline kidney function  - Nephro following, Dr. Calloway, recs appreciated , continue IVFs,  -Avoid nephrotoxic agents    #Seizure Disorder  - continue with home med: Keppra 500 mg BID    #DM2  - BGM QHS  - ISS   - DM diet    #Hyponatremia  - Improved s/p IVF  - Monitor BMP QD      #Advanced Directives  -Trial on room air today  -D/C planning-- will likely need home O2

## 2020-04-04 NOTE — DISCHARGE NOTE PROVIDER - PROVIDER TOKENS
PROVIDER:[TOKEN:[[id number]],FOLLOWUP:[1 week]],PROVIDER:[TOKEN:[[id number]],FOLLOWUP:[1 week]]

## 2020-04-05 ENCOUNTER — TRANSCRIPTION ENCOUNTER (OUTPATIENT)
Age: 60
End: 2020-04-05

## 2020-04-05 VITALS — OXYGEN SATURATION: 90 % | RESPIRATION RATE: 22 BRPM

## 2020-04-05 LAB
ALBUMIN SERPL ELPH-MCNC: 1.5 G/DL — LOW (ref 3.3–5)
ALP SERPL-CCNC: 63 U/L — SIGNIFICANT CHANGE UP (ref 40–120)
ALT FLD-CCNC: 58 U/L — SIGNIFICANT CHANGE UP (ref 12–78)
ANION GAP SERPL CALC-SCNC: 8 MMOL/L — SIGNIFICANT CHANGE UP (ref 5–17)
AST SERPL-CCNC: 78 U/L — HIGH (ref 15–37)
BILIRUB SERPL-MCNC: 0.3 MG/DL — SIGNIFICANT CHANGE UP (ref 0.2–1.2)
BUN SERPL-MCNC: 64 MG/DL — HIGH (ref 7–23)
CALCIUM SERPL-MCNC: 8 MG/DL — LOW (ref 8.5–10.1)
CHLORIDE SERPL-SCNC: 110 MMOL/L — HIGH (ref 96–108)
CO2 SERPL-SCNC: 23 MMOL/L — SIGNIFICANT CHANGE UP (ref 22–31)
CREAT SERPL-MCNC: 3.23 MG/DL — HIGH (ref 0.5–1.3)
CULTURE RESULTS: SIGNIFICANT CHANGE UP
GLUCOSE SERPL-MCNC: 124 MG/DL — HIGH (ref 70–99)
HCT VFR BLD CALC: 40.1 % — SIGNIFICANT CHANGE UP (ref 39–50)
HGB BLD-MCNC: 13.5 G/DL — SIGNIFICANT CHANGE UP (ref 13–17)
MAGNESIUM SERPL-MCNC: 3.2 MG/DL — HIGH (ref 1.6–2.6)
MCHC RBC-ENTMCNC: 27.4 PG — SIGNIFICANT CHANGE UP (ref 27–34)
MCHC RBC-ENTMCNC: 33.7 GM/DL — SIGNIFICANT CHANGE UP (ref 32–36)
MCV RBC AUTO: 81.3 FL — SIGNIFICANT CHANGE UP (ref 80–100)
PLATELET # BLD AUTO: 286 K/UL — SIGNIFICANT CHANGE UP (ref 150–400)
POTASSIUM SERPL-MCNC: 3.8 MMOL/L — SIGNIFICANT CHANGE UP (ref 3.5–5.3)
POTASSIUM SERPL-SCNC: 3.8 MMOL/L — SIGNIFICANT CHANGE UP (ref 3.5–5.3)
PROT SERPL-MCNC: 5.9 GM/DL — LOW (ref 6–8.3)
RBC # BLD: 4.93 M/UL — SIGNIFICANT CHANGE UP (ref 4.2–5.8)
RBC # FLD: 13.2 % — SIGNIFICANT CHANGE UP (ref 10.3–14.5)
SODIUM SERPL-SCNC: 141 MMOL/L — SIGNIFICANT CHANGE UP (ref 135–145)
SPECIMEN SOURCE: SIGNIFICANT CHANGE UP
WBC # BLD: 4.96 K/UL — SIGNIFICANT CHANGE UP (ref 3.8–10.5)
WBC # FLD AUTO: 4.96 K/UL — SIGNIFICANT CHANGE UP (ref 3.8–10.5)

## 2020-04-05 PROCEDURE — 99239 HOSP IP/OBS DSCHRG MGMT >30: CPT

## 2020-04-05 RX ORDER — CEFUROXIME AXETIL 250 MG
1 TABLET ORAL
Qty: 6 | Refills: 0
Start: 2020-04-05 | End: 2020-04-07

## 2020-04-05 RX ORDER — SITAGLIPTIN 50 MG/1
1 TABLET, FILM COATED ORAL
Qty: 30 | Refills: 0
Start: 2020-04-05 | End: 2020-05-04

## 2020-04-05 RX ADMIN — Medication 200 MILLIGRAM(S): at 11:16

## 2020-04-05 RX ADMIN — CEFTRIAXONE 1000 MILLIGRAM(S): 500 INJECTION, POWDER, FOR SOLUTION INTRAMUSCULAR; INTRAVENOUS at 16:28

## 2020-04-05 RX ADMIN — LEVETIRACETAM 500 MILLIGRAM(S): 250 TABLET, FILM COATED ORAL at 11:16

## 2020-04-05 NOTE — PROGRESS NOTE ADULT - ASSESSMENT
58 yo male w hx of seizures, DM, fatigue, and COVID + , resp distress, fevers and now rising WILLIAMS     #Viral Pneumonia 2/2 COVID19  - COVID positive  - s/p Plaquenil x 5 days   -Continue ceftriaxone 1 g QD  - Continue to monitor clinically  -Monitor O2 sat      #WILLIAMS on CKD  -Cr remains elevated   - Urine protein/cr- 6.1, Urine protein 1000 mg  - Acute on chronic renal disease, CKD secondary to DM2 likely  -Continue IV fluids  -Avoid nephrotoxic agents    #Seizure Disorder  - continue with home med: Keppra 500 mg BID    #DM2  - BGM QHS  - ISS   - DM diet    #Hyponatremia  - Improved s/p IVF      #Advanced Directives  -Trial on room air today- 90s on ambulation  -D/C planning-- will likely need home O2

## 2020-04-05 NOTE — PROGRESS NOTE ADULT - SUBJECTIVE AND OBJECTIVE BOX
HPI:    60 yo male with h/o seizure d/o, DM c/o 7 days of feeling unwell.  +fatigue, fevers/chills, decreased PO intake, sob, coughing. Last took tylenol at midnight. + diarrhea x 2 days. No chest pain, abdominal pain, n/v.  No known sick contacts.    SUBJECTIVE:     Patient seen this morning. D/c planning, pending home O2 availability.  Trial without O2 today again proved patient's need for home O2.  Pt was more active out of bed today with dyspnea on exertion.     REVIEW OF SYSTEMS:    CONSTITUTIONAL: No weakness, fevers or chills  RESPIRATORY: No cough, wheezing, hemoptysis; +dyspnea on exertion   CARDIOVASCULAR: No chest pain or palpitations  GASTROINTESTINAL: No abdominal or epigastric pain. No nausea, vomiting, or hematemesis; No diarrhea or constipation.   SKIN: No itching, burning, rashes, or lesions   All other review of systems is negative unless indicated above      Vital Signs Last 24 Hrs  T(C): 36.3 (05 Apr 2020 14:53), Max: 37.1 (05 Apr 2020 08:35)  T(F): 97.4 (05 Apr 2020 14:53), Max: 98.8 (05 Apr 2020 08:35)  HR: 69 (05 Apr 2020 14:53) (69 - 88)  BP: 141/97 (05 Apr 2020 14:53) (141/97 - 154/99)  BP(mean): --  RR: 22 (05 Apr 2020 14:55) (18 - 22)  SpO2: 90% (05 Apr 2020 14:55) (90% - 99%)    I&O's Summary      CAPILLARY BLOOD GLUCOSE      POCT Blood Glucose.: 190 mg/dL (04 Apr 2020 22:24)    PHYSICAL EXAM:  General: NAD, well appearing  CV: +S1/S2, no m/r/g  Lungs: + minimal lower lung crackles  Vascular: pulses 2+ b/l  Extremities: no peripheral edema      MEDICATIONS  (STANDING):  cefTRIAXone Injectable.      cefTRIAXone Injectable. 1000 milliGRAM(s) IV Push every 24 hours  dextrose 5%. 1000 milliLiter(s) (50 mL/Hr) IV Continuous <Continuous>  dextrose 50% Injectable 12.5 Gram(s) IV Push once  dextrose 50% Injectable 25 Gram(s) IV Push once  dextrose 50% Injectable 25 Gram(s) IV Push once  hydroxychloroquine 200 milliGRAM(s) Oral every 12 hours  hydroxychloroquine   Oral   levETIRAcetam 500 milliGRAM(s) Oral two times a day  pravastatin 80 milliGRAM(s) Oral at bedtime  sodium chloride 0.45%. 1000 milliLiter(s) (50 mL/Hr) IV Continuous <Continuous>    MEDICATIONS  (PRN):  acetaminophen   Tablet .. 650 milliGRAM(s) Oral every 4 hours PRN Temp greater or equal to 38.5C (101.3F)  dextrose 40% Gel 15 Gram(s) Oral once PRN Blood Glucose LESS THAN 70 milliGRAM(s)/deciliter  glucagon  Injectable 1 milliGRAM(s) IntraMuscular once PRN Glucose LESS THAN 70 milligrams/deciliter                              13.5   4.96  )-----------( 286      ( 05 Apr 2020 09:51 )             40.1         04-05    141  |  110<H>  |  64<H>  ----------------------------<  124<H>  3.8   |  23  |  3.23<H>    Ca    8.0<L>      05 Apr 2020 09:51  Mg     3.2     04-05    TPro  5.9<L>  /  Alb  1.5<L>  /  TBili  0.3  /  DBili  x   /  AST  78<H>  /  ALT  58  /  AlkPhos  63  04-05    Blood Culture: 03-31 @ 23:33  Organism --  Gram Stain Blood -- Gram Stain --  Specimen Source .Blood Blood-Peripheral  Culture-Blood --        RADIOLOGY/EKG:      EXAM:  US KIDNEYS AND BLADDER                            PROCEDURE DATE:  04/01/2020          IMPRESSION:     Increased echogenicity of the kidneys, suggestive of medical renal disease.         EXAM:  XR CHEST PORTABLE IMMED 1V                            PROCEDURE DATE:  03/31/2020          INTERPRETATION:  PORTABLE CHEST X-RAY    HISTORY: Shortness of Breath, Cough,  Fever.    COMPARISON: None.    FINDINGS:  Moderate patchy bilateral airspace opacities.  No pneumothorax or pleural effusion.   The cardiac silhouette is not accurately assessed by AP technique.    IMPRESSION:  Moderate patchy bilateral airspace opacities, probably infectious from typical or atypical agents, including viral pneumonia.

## 2020-04-05 NOTE — DISCHARGE NOTE NURSING/CASE MANAGEMENT/SOCIAL WORK - PATIENT PORTAL LINK FT
You can access the FollowMyHealth Patient Portal offered by [facility] by registering at the following website: [email or web address]. By joining [employer]’s FollowMyHealth portal, you will also be able to view your health information using other applications (apps) compatible with our system.

## 2020-04-06 LAB
% ALBUMIN: 35.1 % — SIGNIFICANT CHANGE UP
% ALPHA 1: 7.7 % — SIGNIFICANT CHANGE UP
% ALPHA 2: 21.8 % — SIGNIFICANT CHANGE UP
% BETA: 14.6 % — SIGNIFICANT CHANGE UP
% GAMMA: 20.8 % — SIGNIFICANT CHANGE UP
% M SPIKE: 2.3 % — SIGNIFICANT CHANGE UP
ALBUMIN SERPL ELPH-MCNC: 1.8 G/DL — LOW (ref 3.6–5.5)
ALBUMIN/GLOB SERPL ELPH: 0.5 RATIO — SIGNIFICANT CHANGE UP
ALPHA1 GLOB SERPL ELPH-MCNC: 0.4 G/DL — SIGNIFICANT CHANGE UP (ref 0.1–0.4)
ALPHA2 GLOB SERPL ELPH-MCNC: 1.1 G/DL — HIGH (ref 0.5–1)
B-GLOBULIN SERPL ELPH-MCNC: 0.8 G/DL — SIGNIFICANT CHANGE UP (ref 0.5–1)
GAMMA GLOBULIN: 1.1 G/DL — SIGNIFICANT CHANGE UP (ref 0.6–1.6)
INTERPRETATION SERPL IFE-IMP: SIGNIFICANT CHANGE UP
M-SPIKE: 0.1 G/DL — HIGH (ref 0–0)
PROT PATTERN SERPL ELPH-IMP: SIGNIFICANT CHANGE UP

## 2020-04-09 DIAGNOSIS — U07.1 COVID-19: ICD-10-CM

## 2020-04-09 DIAGNOSIS — G40.909 EPILEPSY, UNSPECIFIED, NOT INTRACTABLE, WITHOUT STATUS EPILEPTICUS: ICD-10-CM

## 2020-04-09 DIAGNOSIS — N18.9 CHRONIC KIDNEY DISEASE, UNSPECIFIED: ICD-10-CM

## 2020-04-09 DIAGNOSIS — N17.9 ACUTE KIDNEY FAILURE, UNSPECIFIED: ICD-10-CM

## 2020-04-09 DIAGNOSIS — E11.22 TYPE 2 DIABETES MELLITUS WITH DIABETIC CHRONIC KIDNEY DISEASE: ICD-10-CM

## 2020-04-09 DIAGNOSIS — E87.1 HYPO-OSMOLALITY AND HYPONATREMIA: ICD-10-CM

## 2020-04-09 DIAGNOSIS — E86.0 DEHYDRATION: ICD-10-CM

## 2020-04-09 DIAGNOSIS — J96.01 ACUTE RESPIRATORY FAILURE WITH HYPOXIA: ICD-10-CM

## 2020-04-09 DIAGNOSIS — J12.89 OTHER VIRAL PNEUMONIA: ICD-10-CM

## 2020-05-01 ENCOUNTER — APPOINTMENT (OUTPATIENT)
Dept: NEUROLOGY | Facility: CLINIC | Age: 60
End: 2020-05-01
Payer: COMMERCIAL

## 2020-05-01 DIAGNOSIS — Z00.00 ENCOUNTER FOR GENERAL ADULT MEDICAL EXAMINATION W/OUT ABNORMAL FINDINGS: ICD-10-CM

## 2020-05-01 DIAGNOSIS — U07.1 COVID-19: ICD-10-CM

## 2020-05-01 DIAGNOSIS — G40.409 OTHER GENERALIZED EPILEPSY AND EPILEPTIC SYNDROMES, NOT INTRACTABLE, W/OUT STATUS EPILEPTICUS: ICD-10-CM

## 2020-05-01 DIAGNOSIS — G40.909 EPILEPSY, UNSPECIFIED, NOT INTRACTABLE, W/OUT STATUS EPILEPTICUS: ICD-10-CM

## 2020-05-01 PROCEDURE — 99215 OFFICE O/P EST HI 40 MIN: CPT | Mod: 95

## 2020-05-01 NOTE — DISCUSSION/SUMMARY
[FreeTextEntry1] : He is 59-year-old patient with history of seizure disorder stable seizure-free on medication Keppra. Recently diagnosed with COVID infection in hospital.\par Treated with Plaquenil and ceftriaxone and discharged home.\par Recommend patient continue followup with PCP and nephrologist for CKD.\par Continue Keppra  mg once a day.\par Patient education provided and discussed with the patient regarding medications and side effects.\par Followup with Dr. Marie when safe to come to office.\par Prescription sent.

## 2020-05-01 NOTE — CONSULT LETTER
[Consult Letter:] : I had the pleasure of evaluating your patient, [unfilled]. [Dear  ___] : Dear  [unfilled], [Sincerely,] : Sincerely, [Please see my note below.] : Please see my note below. [Consult Closing:] : Thank you very much for allowing me to participate in the care of this patient.  If you have any questions, please do not hesitate to contact me.

## 2020-05-01 NOTE — PHYSICAL EXAM
[Place] : oriented to place [Person] : oriented to person [Concentration Intact] : normal concentrating ability [Time] : oriented to time [Visual Intact] : visual attention was ~T not ~L decreased [Naming Objects] : no difficulty naming common objects [Repeating Phrases] : no difficulty repeating a phrase [Writing A Sentence] : no difficulty writing a sentence [Comprehension] : comprehension intact [Reading] : reading intact [Fluency] : fluency intact [Past History] : adequate knowledge of personal past history [Cranial Nerves Trigeminal (V)] : facial sensation intact symmetrically [Cranial Nerves Facial (VII)] : face symmetrical [Cranial Nerves Oculomotor (III)] : extraocular motion intact [Cranial Nerves Glossopharyngeal (IX)] : tongue and palate midline [Cranial Nerves Accessory (XI - Cranial And Spinal)] : head turning and shoulder shrug symmetric [Cranial Nerves Vestibulocochlear (VIII)] : hearing was intact bilaterally [Cranial Nerves Hypoglossal (XII)] : there was no tongue deviation with protrusion [Motor Tone] : muscle tone was normal in all four extremities [Motor Strength] : muscle strength was normal in all four extremities [No Muscle Atrophy] : normal bulk in all four extremities [Balance] : balance was intact [Tremor] : no tremor present [Past-pointing] : there was no past-pointing [Plantar Reflex Right Only] : normal on the right [FreeTextEntry5] : Pupils and Fundi not tested [Plantar Reflex Left Only] : normal on the left [Sclera] : the sclera and conjunctiva were normal [FreeTextEntry9] : Reflexes not tested [FreeTextEntry6] : Power not tested [No CVA Tenderness] : no ~M costovertebral angle tenderness [Outer Ear] : the ears and nose were normal in appearance [Neck Appearance] : the appearance of the neck was normal [Abnormal Walk] : normal gait [Skin Color & Pigmentation] : normal skin color and pigmentation

## 2020-05-01 NOTE — HISTORY OF PRESENT ILLNESS
[FreeTextEntry1] : Verbal consent obtained from patient for Tele health consult.\par He is 59-year-old patient with a history of seizure disorder on Keppra  mg daily requesting for renewal of medications.\par States that he was recently admitted to [facility] with a diagnosis of viral pneumonia tested positive with COVID 19 in [facility] from 3/31/20 and discharged home on 4/6/20.\par Treated with ceftriaxone, hydroxychloroquine for 5 days oxygen monitoring discharged home on cefuroxime and reduced dose of januvia and recommended followup with nephrologist and primary care physician.\par \par The patient has no recurrent seizures while in the hospital. No headaches no dizziness no focal weakness. Feeling better neurologically no new complaints. Out of bed ambulating. Not on oxygen at home. No other new complaints per patient and daughter. [Home] : at home, [unfilled] , at the time of the visit. [Medical Office: ([facility])___] : at the medical office located in  [Patient] : the patient [Self] : self [FreeTextEntry2] : [place] [FreeTextEntry4] : Dr. GAYLE Araujo

## 2020-05-01 NOTE — REVIEW OF SYSTEMS
[Feeling Tired] : feeling tired [Seizures] : convulsions [Recent Weight Loss (___ Lbs)] : recent [unfilled] ~Ulb weight loss [Cough] : cough [Shortness Of Breath] : shortness of breath [Negative] : Integumentary [de-identified] : COVID Infection with PNA

## 2020-05-14 ENCOUNTER — TRANSCRIPTION ENCOUNTER (OUTPATIENT)
Age: 60
End: 2020-05-14

## 2021-01-28 ENCOUNTER — APPOINTMENT (OUTPATIENT)
Dept: NEUROLOGY | Facility: CLINIC | Age: 61
End: 2021-01-28
Payer: COMMERCIAL

## 2021-01-28 PROCEDURE — 99212 OFFICE O/P EST SF 10 MIN: CPT | Mod: 95

## 2021-01-28 NOTE — REASON FOR VISIT
[Home] : at home, [unfilled] , at the time of the visit. [Medical Office: ([facility])___] : at the medical office located in  [Verbal consent obtained from patient] : the patient, [unfilled] [Follow-Up: _____] : a [unfilled] follow-up visit

## 2021-01-28 NOTE — HISTORY OF PRESENT ILLNESS
[FreeTextEntry1] : 60 year old man hx of seizure disorder, prescribed Keppra  mg po QD, well tolerated. Last year, admitted to  for COVID-19 PNA. \par

## 2021-01-28 NOTE — DATA REVIEWED
[de-identified] :  \par Exam Date: Apr 10, 2015 11:07:50 AM\par  \par MR brain with and without gadolinium  CPT 13808, 88588\par  \par CLINICAL INFORMATION:   Focal right-sided seizure. Rule out structural\par pathology.  \par  \par TECHNIQUE:   Sagittal and axial T1-weighted images, axial FLAIR\par images, axial T2-weighted images, axial gradient echo images and axial\par diffusion weighted images of the brain were obtained. Following\par gadolinium administration isotropic volumetric T1-weighted images were\par obtained; this data was reformatted using image post processing\par software in multiple imaging planes.  9.5 cc of gadavist were\par administered. 0.5 views were discarded.\par  \par COMPARISON: CT head April 9, 2015\par  \par FINDINGS:  \par  \par The ventricles, sulci and basal cisterns appear unremarkable. There is\par no evidence of acute infarct, hemorrhage, or mass lesion.  There is an\par 8mm T2 hyperintensity within the right anterior frontal deep white\par matter and a 7 mm T2 hyperintensity in the left anterior frontal\par subcortical white matter. There is no evidence of abnormal\par enhancement. There is no evidence of midline shift or herniation\par pattern. No mass effect is found in the brain.  \par  \par The vertebral and internal carotid arteries demonstrate expected flow\par voids indicating their patency.  \par  \par The orbits are unremarkable.  The paranasal sinuses are clear.  The\par nasal cavity appears intact.  The nasopharynx is symmetric.  The\par central skull base and petrous temporal bones are intact.  The\par calvarium appears unremarkable.\par  \par  \par IMPRESSION:  No acute infarct, hemorrhage, or mass lesion. No abnormal\par enhancement.   8mm T2 hyperintensity within the right anterior frontal\par deep white matter and a 7 mm T2 hyperintensity in the left anterior\par frontal subcortical white matter, of unknown clinical significance ,\par differential includes foci of traumatic gliosis, chronic microvascular\par ischemic change, demyelination, among other white matter etiologies.  [de-identified] : EEG 6/21/2019. Impression: normal.

## 2021-01-31 ENCOUNTER — OUTPATIENT (OUTPATIENT)
Dept: OUTPATIENT SERVICES | Facility: HOSPITAL | Age: 61
LOS: 1 days | End: 2021-01-31
Payer: COMMERCIAL

## 2021-01-31 DIAGNOSIS — Z41.9 ENCOUNTER FOR PROCEDURE FOR PURPOSES OTHER THAN REMEDYING HEALTH STATE, UNSPECIFIED: Chronic | ICD-10-CM

## 2021-01-31 DIAGNOSIS — Z20.828 CONTACT WITH AND (SUSPECTED) EXPOSURE TO OTHER VIRAL COMMUNICABLE DISEASES: ICD-10-CM

## 2021-01-31 LAB — SARS-COV-2 RNA SPEC QL NAA+PROBE: SIGNIFICANT CHANGE UP

## 2021-01-31 PROCEDURE — U0003: CPT

## 2021-01-31 PROCEDURE — U0005: CPT

## 2021-01-31 PROCEDURE — C9803: CPT

## 2021-02-01 DIAGNOSIS — Z20.828 CONTACT WITH AND (SUSPECTED) EXPOSURE TO OTHER VIRAL COMMUNICABLE DISEASES: ICD-10-CM

## 2021-04-10 ENCOUNTER — EMERGENCY (EMERGENCY)
Facility: HOSPITAL | Age: 61
LOS: 0 days | Discharge: HOME | End: 2021-04-10
Attending: EMERGENCY MEDICINE
Payer: COMMERCIAL

## 2021-04-10 VITALS — WEIGHT: 184.97 LBS | HEIGHT: 66 IN

## 2021-04-10 VITALS
OXYGEN SATURATION: 97 % | TEMPERATURE: 99 F | SYSTOLIC BLOOD PRESSURE: 133 MMHG | RESPIRATION RATE: 16 BRPM | DIASTOLIC BLOOD PRESSURE: 98 MMHG | HEART RATE: 83 BPM
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
## 2021-04-10 DIAGNOSIS — Z41.9 ENCOUNTER FOR PROCEDURE FOR PURPOSES OTHER THAN REMEDYING HEALTH STATE, UNSPECIFIED: Chronic | ICD-10-CM
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
## 2021-04-10 DIAGNOSIS — M25.511 PAIN IN RIGHT SHOULDER: ICD-10-CM
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
## 2021-04-10 DIAGNOSIS — M17.0 BILATERAL PRIMARY OSTEOARTHRITIS OF KNEE: ICD-10-CM

## 2021-04-10 DIAGNOSIS — S40.011A CONTUSION OF RIGHT SHOULDER, INITIAL ENCOUNTER: ICD-10-CM
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
## 2021-04-10 DIAGNOSIS — F41.9 ANXIETY DISORDER, UNSPECIFIED: ICD-10-CM

## 2021-04-10 DIAGNOSIS — M54.89 OTHER DORSALGIA: ICD-10-CM

## 2021-04-10 DIAGNOSIS — Z91.041 RADIOGRAPHIC DYE ALLERGY STATUS: ICD-10-CM

## 2021-04-10 DIAGNOSIS — E11.9 TYPE 2 DIABETES MELLITUS WITHOUT COMPLICATIONS: ICD-10-CM

## 2021-04-10 DIAGNOSIS — M54.2 CERVICALGIA: ICD-10-CM

## 2021-04-10 DIAGNOSIS — G40.909 EPILEPSY, UNSPECIFIED, NOT INTRACTABLE, WITHOUT STATUS EPILEPTICUS: ICD-10-CM

## 2021-04-10 PROCEDURE — 99284 EMERGENCY DEPT VISIT MOD MDM: CPT | Mod: 25

## 2021-04-10 PROCEDURE — 73030 X-RAY EXAM OF SHOULDER: CPT | Mod: RT

## 2021-04-10 PROCEDURE — 73030 X-RAY EXAM OF SHOULDER: CPT | Mod: 26,RT

## 2021-04-10 PROCEDURE — 70450 CT HEAD/BRAIN W/O DYE: CPT

## 2021-04-10 PROCEDURE — 72125 CT NECK SPINE W/O DYE: CPT

## 2021-04-10 PROCEDURE — 72125 CT NECK SPINE W/O DYE: CPT | Mod: 26

## 2021-04-10 PROCEDURE — 99285 EMERGENCY DEPT VISIT HI MDM: CPT

## 2021-04-10 PROCEDURE — 70450 CT HEAD/BRAIN W/O DYE: CPT | Mod: 26

## 2021-04-10 RX ORDER — CYCLOBENZAPRINE HYDROCHLORIDE 10 MG/1
1 TABLET, FILM COATED ORAL
Qty: 15 | Refills: 0
Start: 2021-04-10

## 2021-04-10 RX ORDER — IBUPROFEN 200 MG
600 TABLET ORAL ONCE
Refills: 0 | Status: COMPLETED | OUTPATIENT
Start: 2021-04-10 | End: 2021-04-10

## 2021-04-10 RX ADMIN — Medication 600 MILLIGRAM(S): at 16:58

## 2021-04-10 NOTE — ED STATDOCS - MUSCULOSKELETAL, MLM
range of motion is not limited and there is no muscle tenderness. +right trapezius muscle tender, no obvious deformity. back no midline tenderness, no step off deformity, +palpable muscle spasm. mild decreased ROM due to pain. RUE distal motor sensory intact R/U/M, normal sensation and capillary refill.

## 2021-04-10 NOTE — ED STATDOCS - PROGRESS NOTE DETAILS
59 y/o male with pmhx of arthrtis, seizures, DM, anxiety presents to the ED c/o neck pain. Pt states that on March second he slipped on black ice in the parking lot of his work place. Denies LOC. +posterior neck, right upper back and right shoulder pain. Pt denies seeing a dr for pain. Describes pain as throbbing. Endorses pain radiating down his right arm, right arm dominant. Denies weakness, numbness or tingling in his right arm. NKDA. no other complaints at this time. PA note: CT head/neck NEG. All labwork and studies reviewed in details with patient. Patient re-examined and re-evaluated. Patient feels much better at this time. ED evaluation, Diagnosis and management discussed with the patient in detail. Workup results discussed with ED attending, OK to dc home. Close ORTHO SPINE follow up encouraged.  Strict ED return instructions discussed in detail and patient given the opportunity to ask any questions about their discharge diagnosis and instructions. Patient verbalized understanding. ~ Ja Lopez PA-C PA: Patient is a 59 y/o male with PMHx of arthrtis, seizures, DM, anxiety who presents to [facility] c/o persistent posterior neck pain that sometimes radiates to his right arm. Pt states that on March second he slipped on black ice in the parking lot of his work place. Denies LOC. DENIES weakness, numbness or tingling in his right arm. ~Ja Lopez PA-C   Patient seen and evaluated in . Will get CT head/neck, xrays, pain control. Reassess. ~Ja Lopez PA-C

## 2021-04-10 NOTE — ED STATDOCS - SKIN, MLM
skin normal color for race, warm, dry and intact. small occipital scalp hematoma overlying skin intact.

## 2021-04-10 NOTE — ED STATDOCS - OBJECTIVE STATEMENT
59 y/o male with pmhx of arthrtis, seizures, DM, anxiety presents to the ED c/o neck pain. Pt states that on March second he slipped on black ice in the parking lot of his work place. Denies LOC. +posterior neck, right upper back and right shoulder pain. Pt denies seeing a dr for pain. Describes pain as throbbing. Endorses pain radiating down his right arm, right arm dominant. Denies weakness, numbness or tingling in his right arm. NKDA. no other complaints at this time.

## 2021-04-10 NOTE — ED STATDOCS - CARE PROVIDER_API CALL
Maribel Daniel)  Orthopaedic Surgery; Surgery of the Hand  [address] [place] [address]  Phone: [phone]  Fax: [phone]  Follow Up Time: Routine

## 2021-04-10 NOTE — ED STATDOCS - ATTENDING CONTRIBUTION TO CARE
I, Ned Ashford MD, personally saw the patient with the PA, and completed the key components of the history and physical exam. I then discussed the management plan with the PA.

## 2021-04-10 NOTE — ED STATDOCS - PATIENT PORTAL LINK FT
You can access the FollowMyHealth Patient Portal offered by [facility] by registering at the following website: [email or web address]. By joining [employer]’s FollowMyHealth portal, you will also be able to view your health information using other applications (apps) compatible with our system.

## 2021-04-10 NOTE — ED STATDOCS - PHYSICAL EXAMINATION
PA NOTE: GEN: AOX3, NAD. HEENT: Throat clear. Airway is patent. EYES: PERRLA. EOMI. Head: NC/AT. NECK: Supple, No JVD. FROM. C-spine non-tender. CV:S1S2, RRR, LUNGS: Non-labored breathing, no tachypnea. O2sat 100% RA. CTA b/l. No w/r/r. CHEST: Equal chest expansion and rise. No deformity. ABD: Soft, NT/ND, no rebound, no guarding. No CVAT. EXT: No e/c/c. 2+ distal pulses. SKIN: No rashes. NEURO: No focal deficits. CN II-XII intact. FROM. 5/5 motor and sensory. ~Ja Lopez PA-C

## 2021-04-10 NOTE — ED STATDOCS - CARE PLAN
Principal Discharge DX:	Neck pain   Principal Discharge DX:	Neck pain  Secondary Diagnosis:	Contusion of right shoulder, initial encounter  Secondary Diagnosis:	Neck strain, initial encounter

## 2021-04-10 NOTE — ED STATDOCS - NSFOLLOWUPINSTRUCTIONS_ED_ALL_ED_FT
ACUTE NECK PAIN - General Information           Acute Neck Pain    WHAT YOU NEED TO KNOW:    What do I need to know about acute neck pain? Acute neck pain starts suddenly, increases quickly, and goes away in a few days. The pain may come and go, or be worse with certain movements. The pain may be only in your neck, or it may move to your arms, back, or shoulders. You may also have pain that starts in another body area and moves to your neck.     Vertebral Column         What causes or increases my risk for acute neck pain? Acute neck pain is often caused by a muscle strain or ligament sprain. Any of the following can increase your risk for acute neck pain:   •Inflammation of discs in your neck      •A condition that affects neck to arm nerves, such as thoracic outlet syndrome or brachial neuritis       •A trauma or injury to your neck, such as being hit from behind in a car (whiplash) or sleeping in a bad position      •Shingles, or an infection such as meningitis      How is the cause of acute neck pain diagnosed? Your healthcare provider will ask about your symptoms and when they began. Tell him or her if you were recently in an accident or had another injury to your neck. He or she will examine your neck and shoulders. He or she may also have you move your head in certain ways to see if any position causes or relieves the pain.  •Blood tests may be used to measure the amount of inflammation or to check for signs of an infection.      •X-ray or MRI pictures may show a neck injury or medical condition. Do not enter the MRI room with anything metal. Metal can cause serious damage. Tell the healthcare provider if you have any metal in or on your body.      How is acute neck pain treated? Treatment will depend on what is causing your pain.  •Medicines may be prescribed or recommended for pain. You may need medicine to treat nerve pain or to stop muscle spasms. Medicines may also be given to reduce inflammation. Your healthcare provider may inject medicine into a nerve to block pain. Over-the-counter NSAID medicine or acetaminophen may be recommended to help treat minor pain or inflammation.      •Traction is used to relieve pressure from nerves. Your head is gently pulled up and away from your neck. This stretches muscles and ligaments and makes more room for the spine. Your healthcare provider will tell you the kind of traction that will help your neck pain. Do not use traction devices at home unless directed by your healthcare provider.      What can I do to manage or prevent acute neck pain?   •Rest your neck as directed. Do not make sudden movements, such as turning your head quickly. Your healthcare provider may recommend you wear a cervical collar for a short time. The collar will prevent you from moving your head. This will give your neck time to heal if an injury is causing your neck pain. Ask your healthcare provider when you can return to sports or other normal daily activities.  Cervical Collars           •Apply heat as directed. Heat helps relieve pain and swelling. Use a heat wrap, or soak a small towel in warm water. Wring out the extra water. Apply the heat wrap or towel for 20 minutes every hour, or as directed.      •Apply ice as directed. Ice helps relieve pain and swelling, and can help prevent tissue damage. Use an ice pack, or put ice in a bag. Cover the ice pack or back with a towel before you apply it to your neck. Apply the ice pack or ice for 15 minutes every hour, or as directed. Your healthcare provider can tell you how often to apply ice.      •Do neck exercises as directed. Neck exercises help strengthen the muscles and increase range of motion. Your healthcare provider will tell you which exercises are right for you. He or she may give you instructions or recommend that you work with a physical therapist. Your healthcare provider or therapist can make sure you are doing the exercises correctly.      •Maintain good posture. Try to keep your head and shoulders lifted when you sit. If you work in front of a computer, make sure the monitor is at the right level. You should not need to look up down to see the screen. You should also not have to lean forward to be able to read what is on the screen. Make sure your keyboard, mouse, and other computer items are placed where you do not have to extend your shoulder to reach them. Get up often if you work in front of a computer or sit for long periods of time. Stretch or walk around to keep your neck muscles loose.      When should I seek immediate care?   •You have an injury that causes neck pain and shooting pain down your arms or legs.      •Your neck pain suddenly becomes severe.      •You have neck pain along with numbness, tingling, or weakness in your arms or legs.      •You have a stiff neck, a headache, and a fever.      When should I call my doctor?   •You have new or worsening symptoms.      •Your symptoms continue even after treatment.      •You have questions or concerns about your condition or care.      CARE AGREEMENT:    You have the right to help plan your care. Learn about your health condition and how it may be treated. Discuss treatment options with your healthcare providers to decide what care you want to receive. You always have the right to refuse treatment. ACUTE NECK PAIN - General Information       Acute Neck Pain    WHAT YOU NEED TO KNOW:    What do I need to know about acute neck pain? Acute neck pain starts suddenly, increases quickly, and goes away in a few days. The pain may come and go, or be worse with certain movements. The pain may be only in your neck, or it may move to your arms, back, or shoulders. You may also have pain that starts in another body area and moves to your neck.     Vertebral Column         What causes or increases my risk for acute neck pain? Acute neck pain is often caused by a muscle strain or ligament sprain. Any of the following can increase your risk for acute neck pain:   •Inflammation of discs in your neck      •A condition that affects neck to arm nerves, such as thoracic outlet syndrome or brachial neuritis       •A trauma or injury to your neck, such as being hit from behind in a car (whiplash) or sleeping in a bad position      •Shingles, or an infection such as meningitis      How is the cause of acute neck pain diagnosed? Your healthcare provider will ask about your symptoms and when they began. Tell him or her if you were recently in an accident or had another injury to your neck. He or she will examine your neck and shoulders. He or she may also have you move your head in certain ways to see if any position causes or relieves the pain.  •Blood tests may be used to measure the amount of inflammation or to check for signs of an infection.      •X-ray or MRI pictures may show a neck injury or medical condition. Do not enter the MRI room with anything metal. Metal can cause serious damage. Tell the healthcare provider if you have any metal in or on your body.      How is acute neck pain treated? Treatment will depend on what is causing your pain.  •Medicines may be prescribed or recommended for pain. You may need medicine to treat nerve pain or to stop muscle spasms. Medicines may also be given to reduce inflammation. Your healthcare provider may inject medicine into a nerve to block pain. Over-the-counter NSAID medicine or acetaminophen may be recommended to help treat minor pain or inflammation.      •Traction is used to relieve pressure from nerves. Your head is gently pulled up and away from your neck. This stretches muscles and ligaments and makes more room for the spine. Your healthcare provider will tell you the kind of traction that will help your neck pain. Do not use traction devices at home unless directed by your healthcare provider.      What can I do to manage or prevent acute neck pain?   •Rest your neck as directed. Do not make sudden movements, such as turning your head quickly. Your healthcare provider may recommend you wear a cervical collar for a short time. The collar will prevent you from moving your head. This will give your neck time to heal if an injury is causing your neck pain. Ask your healthcare provider when you can return to sports or other normal daily activities.  Cervical Collars           •Apply heat as directed. Heat helps relieve pain and swelling. Use a heat wrap, or soak a small towel in warm water. Wring out the extra water. Apply the heat wrap or towel for 20 minutes every hour, or as directed.      •Apply ice as directed. Ice helps relieve pain and swelling, and can help prevent tissue damage. Use an ice pack, or put ice in a bag. Cover the ice pack or back with a towel before you apply it to your neck. Apply the ice pack or ice for 15 minutes every hour, or as directed. Your healthcare provider can tell you how often to apply ice.      •Do neck exercises as directed. Neck exercises help strengthen the muscles and increase range of motion. Your healthcare provider will tell you which exercises are right for you. He or she may give you instructions or recommend that you work with a physical therapist. Your healthcare provider or therapist can make sure you are doing the exercises correctly.      •Maintain good posture. Try to keep your head and shoulders lifted when you sit. If you work in front of a computer, make sure the monitor is at the right level. You should not need to look up down to see the screen. You should also not have to lean forward to be able to read what is on the screen. Make sure your keyboard, mouse, and other computer items are placed where you do not have to extend your shoulder to reach them. Get up often if you work in front of a computer or sit for long periods of time. Stretch or walk around to keep your neck muscles loose.      When should I seek immediate care?   •You have an injury that causes neck pain and shooting pain down your arms or legs.      •Your neck pain suddenly becomes severe.      •You have neck pain along with numbness, tingling, or weakness in your arms or legs.      •You have a stiff neck, a headache, and a fever.      When should I call my doctor?   •You have new or worsening symptoms.      •Your symptoms continue even after treatment.      •You have questions or concerns about your condition or care.      CARE AGREEMENT:    You have the right to help plan your care. Learn about your health condition and how it may be treated. Discuss treatment options with your healthcare providers to decide what care you want to receive. You always have the right to refuse treatment.                  SHOULDER SPRAIN - General Information           Shoulder Sprain    WHAT YOU NEED TO KNOW:    What is a shoulder sprain? A shoulder sprain happens when a ligament in your shoulder is stretched or torn. Ligaments are the tough tissues that connect bones. Ligaments allow you to lift, lower, and rotate your arm.    Shoulder Anatomy         What are the signs and symptoms of a shoulder sprain?   •Bruising or changes in skin color      •Pain and stiffness, especially with movement      •Swelling and tenderness      How is a shoulder sprain diagnosed? Your healthcare provider will ask you about your injury and examine you. Tell him or her if you heard a snap or pop when you were injured. Your healthcare provider will check the movement and strength of your joint. You may be asked to move the joint yourself. You may also need any of the following:   •An x-ray, CT scan, or MRI may show the sprain or other damage. You may be given contrast liquid to help your injury show up better in the pictures. Tell the healthcare provider if you have ever had an allergic reaction to contrast liquid. Do not enter the MRI room with anything metal. Metal can cause serious injury. Tell the healthcare provider if you have any metal in or on your body.      •Arthroscopy is a procedure to look inside your joint. Your healthcare provider makes a small incision in your joint and inserts a scope through it. The scope is a long tube with a magnifying glass, a camera, and a light on the end.      How is a shoulder sprain treated? Treatment depends on how severe your injury is and when the injury occurred. You may need any of the following:  •A sling may be needed. A sling will limit movement of your arm and protect your shoulder joint.   Shoulder Sling           •Acetaminophen decreases pain and fever. It is available without a doctor's order. Ask how much to take and how often to take it. Follow directions. Read the labels of all other medicines you are using to see if they also contain acetaminophen, or ask your doctor or pharmacist. Acetaminophen can cause liver damage if not taken correctly. Do not use more than 4 grams (4,000 milligrams) total of acetaminophen in one day.       •NSAIDs, such as ibuprofen, help decrease swelling, pain, and fever. This medicine is available with or without a doctor's order. NSAIDs can cause stomach bleeding or kidney problems in certain people. If you take blood thinner medicine, always ask your healthcare provider if NSAIDs are safe for you. Always read the medicine label and follow directions.      •Prescription pain medicine may be given. Ask your healthcare provider how to take this medicine safely. Some prescription pain medicines contain acetaminophen. Do not take other medicines that contain acetaminophen without talking to your healthcare provider. Too much acetaminophen may cause liver damage. Prescription pain medicine may cause constipation. Ask your healthcare provider how to prevent or treat constipation.       •Physical therapy may be recommended by your healthcare provider. A physical therapist teaches you exercises to help improve movement and strength, and to decrease pain.       •Surgery may be needed to repair or replace a torn ligament if your sprain does not heal with other treatments.      How can I manage a shoulder sprain?   •Rest your shoulder so it can heal. Avoid moving your shoulder as your injury heals. This will help decrease the risk of more damage to your shoulder.      •Apply ice on your shoulder for 20 to 30 minutes every 2 hours or as directed. Use an ice pack, or put crushed ice in a plastic bag. Cover it with a towel before you apply it to your shoulder. Ice helps prevent tissue damage and decreases swelling and pain.      •Compress your shoulder as directed. Compression provides support and helps decrease swelling and movement so your shoulder can heal.      How can I help prevent a shoulder sprain?   •Do not exercise when you are tired or in pain. Warm up and stretch before you exercise.      •Wear equipment to protect yourself when you play sports.      •Wear shoes that fit well and run on flat surfaces to prevent falls.      When should I seek immediate care?   •You feel severe pain in your shoulder when you move it, or it is touched.      •Your skin feels cold or clammy.      •You have numbness, tingling, or a feeling of pins and needles in your shoulder.       •The skin on your injured shoulder looks blue or pale.      When should I call my doctor?   •You have new or increased swelling and pain in your shoulder.      •You have new or increased stiffness when you move your injured shoulder.      •Your symptoms do not improve within 5 to 7 days.       •You have questions or concerns about your condition or care.      CARE AGREEMENT:    You have the right to help plan your care. Learn about your health condition and how it may be treated. Discuss treatment options with your healthcare providers to decide what care you want to receive. You always have the right to refuse treatment.

## 2021-04-10 NOTE — ED STATDOCS - ENMT, MLM
Nasal mucosa clear.  Mouth with normal mucosa  Throat has no vesicles, no oropharyngeal exudates and uvula is midline. no facial tenderness.

## 2021-07-29 ENCOUNTER — APPOINTMENT (OUTPATIENT)
Dept: NEUROLOGY | Facility: CLINIC | Age: 61
End: 2021-07-29

## 2021-08-19 ENCOUNTER — APPOINTMENT (OUTPATIENT)
Dept: NEUROLOGY | Facility: CLINIC | Age: 61
End: 2021-08-19
Payer: COMMERCIAL

## 2021-08-19 VITALS
BODY MASS INDEX: 34.84 KG/M2 | SYSTOLIC BLOOD PRESSURE: 124 MMHG | HEIGHT: 67 IN | DIASTOLIC BLOOD PRESSURE: 76 MMHG | HEART RATE: 74 BPM | WEIGHT: 222 LBS | TEMPERATURE: 97.8 F

## 2021-08-19 PROCEDURE — 99213 OFFICE O/P EST LOW 20 MIN: CPT

## 2021-08-19 NOTE — DISCUSSION/SUMMARY
[FreeTextEntry1] : 61 year old man hx of seizures, ?alcohol related. reviewed treatment, no changes at this time.\par RTO 6 months [Well-controlled] : well-controlled [Idiopathic] : idiopathic  [Risks Associated with Driving/[place] Law] : As per my usual protocol, the patient was advised in regards to risks and driving privileges associated with the [place] Guidelines.  [Safety Recommendations] : The patient was advised in regards to the risk of seizures and general seizure safety recommendations including not to be bathing alone, climbing to high places and operating heavy machinery. [Compliance with Medications] : The importance of compliance with medications was reinforced. [Medication Side Effects] : High frequency and serious potential medication adverse effects were reviewed with the patient, including but not exclusive to psychiatric effects.  Information sheets on medication side effects were made available to the patient in our clinic.  The patient or advocate agrees to notify us for any concerns. [Sleep Hygiene/Sleep Disruption Risks] : Sleep hygiene and the risks of sleep disruption were discussed.

## 2021-08-19 NOTE — HISTORY OF PRESENT ILLNESS
[FreeTextEntry1] : 61 year old man hx of seizures, none for several years, continues on Keppra  mg PO QD. Last seizure was in 2015. No blackouts, no episodes of confusion or disorientation. \par Feeling well, no change in other medications, treatments.

## 2021-11-25 NOTE — ED STATDOCS - CLINICAL SUMMARY MEDICAL DECISION MAKING FREE TEXT BOX
none elderly male here c/o of neck pain, back pain, will obtain imagining, monitor observe and reassess. elderly male here c/o of neck pain, back pain, will obtain imagining, monitor observe and reassess.    PA note: CT head/neck NEG. All labwork and studies reviewed in details with patient. Patient re-examined and re-evaluated. Patient feels much better at this time. ED evaluation, Diagnosis and management discussed with the patient in detail. Workup results discussed with ED attending, OK to dc home. Close ORTHO SPINE follow up encouraged.  Strict ED return instructions discussed in detail and patient given the opportunity to ask any questions about their discharge diagnosis and instructions. Patient verbalized understanding. ~ Ja Lopez PA-C

## 2022-01-01 NOTE — ED ADULT TRIAGE NOTE - DIRECT TO ROOM CARE INITIATED:
11-Oct-2017 19:00 SBAR OUT Report: BABY    Verbal report given to Ivan Gao RN (full name and credentials) on this patient, being transferred to MIU (unit) for routine progression of care. Report consisted of Situation, Background, Assessment, and Recommendations (SBAR).  ID bands were compared with the identification form, and verified with the patient's mother and receiving nurse. Information from the SBAR, Kardex, Intake/Output, and MAR and the [place] Report was reviewed with the receiving nurse. According to the estimated gestational age scale, this infant is 39.5. BETA STREP:   The mother's Group Beta Strep (GBS) result was negative. Prenatal care was received by this patients mother. Opportunity for questions and clarification provided.

## 2022-03-28 ENCOUNTER — APPOINTMENT (OUTPATIENT)
Dept: NEUROLOGY | Facility: CLINIC | Age: 62
End: 2022-03-28

## 2022-03-28 NOTE — DISCUSSION/SUMMARY
[FreeTextEntry1] : 61-year-old man history of seizures, doing very well, stable on Keppra  mg once a day.\par

## 2022-03-28 NOTE — DATA REVIEWED
[de-identified] :  \par Exam Date: Apr 10, 2015 11:07:50 AM\par  \par MR brain with and without gadolinium  CPT 25651, 19238\par  \par CLINICAL INFORMATION:   Focal right-sided seizure. Rule out structural\par pathology.  \par  \par TECHNIQUE:   Sagittal and axial T1-weighted images, axial FLAIR\par images, axial T2-weighted images, axial gradient echo images and axial\par diffusion weighted images of the brain were obtained. Following\par gadolinium administration isotropic volumetric T1-weighted images were\par obtained; this data was reformatted using image post processing\par software in multiple imaging planes.  9.5 cc of gadavist were\par administered. 0.5 views were discarded.\par  \par COMPARISON: CT head April 9, 2015\par  \par FINDINGS:  \par  \par The ventricles, sulci and basal cisterns appear unremarkable. There is\par no evidence of acute infarct, hemorrhage, or mass lesion.  There is an\par 8mm T2 hyperintensity within the right anterior frontal deep white\par matter and a 7 mm T2 hyperintensity in the left anterior frontal\par subcortical white matter. There is no evidence of abnormal\par enhancement. There is no evidence of midline shift or herniation\par pattern. No mass effect is found in the brain.  \par  \par The vertebral and internal carotid arteries demonstrate expected flow\par voids indicating their patency.  \par  \par The orbits are unremarkable.  The paranasal sinuses are clear.  The\par nasal cavity appears intact.  The nasopharynx is symmetric.  The\par central skull base and petrous temporal bones are intact.  The\par calvarium appears unremarkable.\par  \par  \par IMPRESSION:  No acute infarct, hemorrhage, or mass lesion. No abnormal\par enhancement.   8mm T2 hyperintensity within the right anterior frontal\par deep white matter and a 7 mm T2 hyperintensity in the left anterior\par frontal subcortical white matter, of unknown clinical significance ,\par differential includes foci of traumatic gliosis, chronic microvascular\par ischemic change, demyelination, among other white matter etiologies.  [de-identified] :  EXAM:  CT CERVICAL SPINE                      \par \par EXAM:  CT BRAIN                      \par \par \par PROCEDURE DATE:  04/10/2021  \par \par \par \par INTERPRETATION:  Noncontrast CT of the brain and cervical spine\par \par CLINICAL INDICATION: Head and neck trauma\par \par TECHNIQUE: Axial CT scanning of the brain and cervical spine were obtained without the administration of intravenous contrast.  Images were reformatted in the sagittal and coronal planes.\par \par COMPARISON: CT brain 4/9/2015. MRI brain 4/10/2015.\par \par FINDINGS:\par \par CT brain:\par \par No hydrocephalus, mass effect, midline shift, acute intracranial hemorrhage, or brain edema.\par \par No displaced calvarial fracture.\par \par Mild bilateral maxillary sinus mucosal thickening, otherwise the paranasal sinuses and mastoid air cells are clear.\par \par CT cervical spine:\par \par No acute fracture or traumatic subluxation. No prevertebral soft tissue swelling. Vertebral body height and alignment maintained. Straightening of the normal cervical lordosis. Intervertebral disc spaces preserved.\par \par Alignment at the craniocervical junction unremarkable.\par \par Mild multilevel degenerative changes. Visualized lung apices clear.\par \par IMPRESSION:\par \par CT brain:\par No acute intracranial hemorrhage, brain edema, or mass effect.\par No displaced calvarial fracture.\par \par CT cervical spine:\par No acute fracture or traumatic subluxation.\par No prevertebral soft tissue swelling.\par Mild degenerative changes.\par \par \par \par Levetiracetam Level, Serum 9.1  Low mcg/mL  12.0 - 46.0 Final \par -------------------ADDITIONAL INFORMATION-------------------\par This test was developed and its performance characteristics\par determined by [facility] in a manner consistent with CLIA\par requirements. This test has not been cleared or approved by\par the U.S. Food and Drug Administration.\par \par Test Performed by:\par [facility]\par [address] [place] [address]

## 2022-03-28 NOTE — PHYSICAL EXAM
[General Appearance - Alert] : alert [General Appearance - In No Acute Distress] : in no acute distress [Oriented To Time, Place, And Person] : oriented to person, place, and time [Impaired Insight] : insight and judgment were intact [Affect] : the affect was normal [Person] : oriented to person [Place] : oriented to place [Time] : oriented to time [Concentration Intact] : normal concentrating ability [Fluency] : fluency intact [Comprehension] : comprehension intact [Cranial Nerves Optic (II)] : visual acuity intact bilaterally,  visual fields full to confrontation, pupils equal round and reactive to light [Cranial Nerves Oculomotor (III)] : extraocular motion intact [Cranial Nerves Trigeminal (V)] : facial sensation intact symmetrically [Cranial Nerves Facial (VII)] : face symmetrical [Cranial Nerves Vestibulocochlear (VIII)] : hearing was intact bilaterally [Cranial Nerves Glossopharyngeal (IX)] : tongue and palate midline [Cranial Nerves Accessory (XI - Cranial And Spinal)] : head turning and shoulder shrug symmetric [Cranial Nerves Hypoglossal (XII)] : there was no tongue deviation with protrusion [Motor Tone] : muscle tone was normal in all four extremities [Motor Strength] : muscle strength was normal in all four extremities [No Muscle Atrophy] : normal bulk in all four extremities [Motor Handedness Right-Handed] : the patient is right hand dominant [Paresis Pronator Drift Right-Sided] : no pronator drift on the right [Paresis Pronator Drift Left-Sided] : no pronator drift on the left [Sensation Tactile Decrease] : light touch was intact [Abnormal Walk] : normal gait [Balance] : balance was intact [Past-pointing] : there was no past-pointing [Tremor] : no tremor present [Dysdiadochokinesia Bilaterally] : not present

## 2022-04-05 NOTE — ED ADULT NURSE NOTE - PATIENT DISCHARGE SIGNATURE
No new care gaps identified.  Powered by [employer]. Reference number: [id number].   4/05/2022 11:45:12 AM CDT  
09-Mar-2018

## 2022-04-28 ENCOUNTER — RX RENEWAL (OUTPATIENT)
Age: 62
End: 2022-04-28

## 2022-06-08 NOTE — ED STATDOCS - NEUROLOGICAL, MLM
[FreeTextEntry1] : No significant interval health changes. No interval surgery, hospitalizations, fractures, or change in medications.  Had mild bout of COVID treated with oral Paxilovid .  Did not require hospitalization.\par Pt previously seen in 2018, returned 7/2021.\par \par The patient has been generally good health. She has been told of low bone density in the past consistent with osteopenia. Outside BMD 8/2017 reported hip T score -2.6. The spine was reported as normal I assume that this was a false elevation due to arthritis. The patient has not suffered any fractures. The patient has no suggestion of secondary causes for osteoporosis. Pt was recommended to start medical therapy in 1/2018, but pt declined at that time. No further f/u. Pt started Actonel 7/2021.\par Stopped Actonel due to  a general "malaise" feeling after each dose,\par Bone mineral density: 5/2021 \par Indication: outside study [facility]\par Spine: -0.6 not accurate due to arthritis\par Total hip: -3.5 osteoporosis, no prior\par Femoral neck: -2.5 osteoporosis, prior -2.6\par \par Pt states she was told of incidentally noted thyroid nodule on physical examination by GYN. Was recommended to have thyroid US. Pt was previously c/o right sided throat tenderness. TUS 11/2021 indicates 2 nodules, one left and one right. It is possible that pt had thyroiditis which may have caused or contributed to her throat tenderness.\par Repeat examination and thyroid functions March 2022 were normal.  Patient has no current symptoms suggestive of thyroid disease.\par H/o cataract surgery, no complications. sensation is normal and strength is normal.

## 2022-06-14 ENCOUNTER — EMERGENCY (EMERGENCY)
Facility: HOSPITAL | Age: 62
LOS: 0 days | Discharge: HOME | End: 2022-06-15
Attending: EMERGENCY MEDICINE
Payer: COMMERCIAL

## 2022-06-14 VITALS
HEART RATE: 74 BPM | OXYGEN SATURATION: 95 % | TEMPERATURE: 98 F | SYSTOLIC BLOOD PRESSURE: 127 MMHG | RESPIRATION RATE: 18 BRPM | DIASTOLIC BLOOD PRESSURE: 72 MMHG

## 2022-06-14 VITALS — HEIGHT: 66 IN | WEIGHT: 207.01 LBS

## 2022-06-14 DIAGNOSIS — Z91.041 RADIOGRAPHIC DYE ALLERGY STATUS: ICD-10-CM
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
## 2022-06-14 DIAGNOSIS — Z41.9 ENCOUNTER FOR PROCEDURE FOR PURPOSES OTHER THAN REMEDYING HEALTH STATE, UNSPECIFIED: Chronic | ICD-10-CM

## 2022-06-14 DIAGNOSIS — S30.0XXA CONTUSION OF LOWER BACK AND PELVIS, INITIAL ENCOUNTER: ICD-10-CM

## 2022-06-14 DIAGNOSIS — M17.0 BILATERAL PRIMARY OSTEOARTHRITIS OF KNEE: ICD-10-CM
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
## 2022-06-14 DIAGNOSIS — Z79.84 LONG TERM (CURRENT) USE OF ORAL HYPOGLYCEMIC DRUGS: ICD-10-CM

## 2022-06-14 DIAGNOSIS — Z23 ENCOUNTER FOR IMMUNIZATION: ICD-10-CM

## 2022-06-14 DIAGNOSIS — Z86.69 PERSONAL HISTORY OF OTHER DISEASES OF THE NERVOUS SYSTEM AND SENSE ORGANS: ICD-10-CM
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
## 2022-06-14 DIAGNOSIS — E11.9 TYPE 2 DIABETES MELLITUS WITHOUT COMPLICATIONS: ICD-10-CM

## 2022-06-14 PROCEDURE — 99053 MED SERV 10PM-8AM 24 HR FAC: CPT

## 2022-06-14 PROCEDURE — 99283 EMERGENCY DEPT VISIT LOW MDM: CPT | Mod: 25

## 2022-06-14 PROCEDURE — 90471 IMMUNIZATION ADMIN: CPT

## 2022-06-14 PROCEDURE — 99284 EMERGENCY DEPT VISIT MOD MDM: CPT

## 2022-06-14 PROCEDURE — 90715 TDAP VACCINE 7 YRS/> IM: CPT

## 2022-06-14 RX ORDER — TETANUS TOXOID, REDUCED DIPHTHERIA TOXOID AND ACELLULAR PERTUSSIS VACCINE, ADSORBED 5; 2.5; 8; 8; 2.5 [IU]/.5ML; [IU]/.5ML; UG/.5ML; UG/.5ML; UG/.5ML
0.5 SUSPENSION INTRAMUSCULAR ONCE
Refills: 0 | Status: COMPLETED | OUTPATIENT
Start: 2022-06-14 | End: 2022-06-14

## 2022-06-14 NOTE — ED ADULT TRIAGE NOTE - CHIEF COMPLAINT QUOTE
Pt c/o dog bite to the R lower back this morning. 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 Unknown if dog was UTD on vaccines. Took Advil with partial relief. Denies other complaints.

## 2022-06-14 NOTE — ED ADULT TRIAGE NOTE - ACCOMPANIED BY
Self Cheek Interpolation Flap Text: A decision was made to reconstruct the defect utilizing an interpolation axial flap and a staged reconstruction.  A telfa template was made of the defect.  This telfa template was then used to outline the Cheek Interpolation flap.  The donor area for the pedicle flap was then injected with anesthesia.  The flap was excised through the skin and subcutaneous tissue down to the layer of the underlying musculature.  The interpolation flap was carefully excised within this deep plane to maintain its blood supply.  The edges of the donor site were undermined.   The donor site was closed in a primary fashion.  The pedicle was then rotated into position and sutured.  Once the tube was sutured into place, adequate blood supply was confirmed with blanching and refill.  The pedicle was then wrapped with xeroform gauze and dressed appropriately with a telfa and gauze bandage to ensure continued blood supply and protect the attached pedicle.

## 2022-06-15 RX ADMIN — TETANUS TOXOID, REDUCED DIPHTHERIA TOXOID AND ACELLULAR PERTUSSIS VACCINE, ADSORBED 0.5 MILLILITER(S): 5; 2.5; 8; 8; 2.5 SUSPENSION INTRAMUSCULAR at 00:23

## 2022-06-15 RX ADMIN — Medication 1 TABLET(S): at 00:22

## 2022-06-15 NOTE — ED STATDOCS - PHYSICAL EXAMINATION
Constitutional: NAD, well appearing  HEENT: no rhinorrhea  CVS:  wwp  Resp:  no resp distress  GI: soft, ntnd, R low back with ecchymosis, but no obvious skin break  MSK:  no restriction to rom, full ROM to all extremities  Neuro:  A&Ox3, moving all extremities equally  Skin: no rash  psych: clear thought content  Heme/lymph:  No LAD

## 2022-06-15 NOTE — ED STATDOCS - PATIENT PORTAL LINK FT
You can access the FollowMyHealth Patient Portal offered by [facility] by registering at the following website: [email or web address]. By joining [employer]’s FollowMyHealth portal, you will also be able to view your health information using other applications (apps) compatible with our system.

## 2022-06-15 NOTE — ED STATDOCS - CLINICAL SUMMARY MEDICAL DECISION MAKING FREE TEXT BOX
Pt well appearing.  Not even clear if bite broke skin.  Discussed with FAYE who will eval dog.  No role for rabies vaccination at this time. Pt well appearing.  No suggestion of visceral organ injury or internal damage.  Tetanus updated.  WIll start on augmentin. D/c home with strict return precautions and prompt outpatient f/u.

## 2022-06-15 NOTE — ED STATDOCS - OBJECTIVE STATEMENT
62 y M pmh of DM2 presenting after dog bite to R low back earlier this morning.  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  Unknown vaccination status of dogs, but it is domestic and in owners possssion.  Pt denies any significant pain.  Unsure of last tetanus.  No other injuries.

## 2022-06-15 NOTE — ED ADULT NURSE NOTE - OBJECTIVE STATEMENT
Pt present to ED oriented x4 with c/o dog bite to the R lower back this morning. 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 Pt unable to recall if dog is UTD with vaccines. Pt denies any s&s of distress, VSS on RA. R lower back WNL, no active bleeding noted, skin intact. WCTM.       Pt c/o dog bite to the R lower back this morning. 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 Unknown if dog was UTD on vaccines. Took Advil with partial relief. Denies other complaints.

## 2022-10-20 ENCOUNTER — APPOINTMENT (OUTPATIENT)
Dept: NEUROLOGY | Facility: CLINIC | Age: 62
End: 2022-10-20

## 2022-10-27 ENCOUNTER — NON-APPOINTMENT (OUTPATIENT)
Age: 62
End: 2022-10-27

## 2022-11-29 ENCOUNTER — APPOINTMENT (OUTPATIENT)
Dept: NEUROLOGY | Facility: CLINIC | Age: 62
End: 2022-11-29

## 2022-12-07 NOTE — ED ADULT TRIAGE NOTE - SOURCE OF INFORMATION
HPI:  55 year old female with PMHx of Asthma, Anemia, Bladder cancer with metastases to the lungs, Bradycardia, Hypertension, Dyslipidemia, Hydronephrosis, R-nephrostomy tube, Liver cyst, Parathyroid tumor, Chronic pain and PPM in-situ coming to ED c/o SOB for 2 days. Patient states that she developed sudden onset dyspnea 2 days ago at rest. The dyspnoea is not alleviated or worsened by any factors. Pt also endorses vaginal bleeding for which she was evaluated by OBGYN at [facility] and was recommended further work up including TVUS but patient refused. Patient denies any chest pain. She was recently admitted at [facility] in October 2022 for hemoptysis and CT chest at that time showed Numerous bilateral pulmonary metastases; mild interval increase in size of a few of the largest pulmonary metastases compared to 9/20/202. Hospital course at that time was complicated by a drop in Hb for which she received blood transfusion. She also tested positive for COVID 19 but was asymptomatic. Pt has a chronic nephrostomy tube that was placed at [facility] for right hydronephrosis. She follows with Dr Kan at [facility]. Pt last recieved chemotherapy in may of 2022. She denies any fevers, chest pain, abd pain.    In ED VS: T 98, Hb 7.8, WBC 15, Na 132   (01 Dec 2022 19:07)     Pt is seen and examined  pt is awake and lying in bed/out of bed to chair  pt seems comfortable and denies any complaints at this time    ROS:  Negative except for:    MEDICATIONS  (STANDING):  ampicillin/sulbactam  IVPB      ampicillin/sulbactam  IVPB 3 Gram(s) IV Intermittent every 6 hours  ferrous    sulfate 325 milliGRAM(s) Oral daily  folic acid 1 milliGRAM(s) Oral daily  lactulose Syrup 15 Gram(s) Oral every other day  lidocaine   4% Patch 1 Patch Transdermal every 24 hours  pantoprazole    Tablet 40 milliGRAM(s) Oral before breakfast  polyethylene glycol 3350 17 Gram(s) Oral daily  senna 2 Tablet(s) Oral at bedtime    MEDICATIONS  (PRN):  acetaminophen     Tablet .. 325 milliGRAM(s) Oral every 4 hours PRN Moderate Pain (4 - 6)  aluminum hydroxide/magnesium hydroxide/simethicone Suspension 30 milliLiter(s) Oral every 4 hours PRN Dyspepsia  bisacodyl Suppository 10 milliGRAM(s) Rectal daily PRN Constipation  diphenhydrAMINE 25 milliGRAM(s) Oral every 4 hours PRN Itching  morphine   Solution 5 milliGRAM(s) Oral every 3 hours PRN Severe Pain (7 - 10)  simethicone 80 milliGRAM(s) Chew every 6 hours PRN Gas      Allergies    No Known Allergies    Intolerances        Vital Signs Last 24 Hrs  T(C): 36.6 (07 Dec 2022 05:27), Max: 36.7 (06 Dec 2022 21:44)  T(F): 97.9 (07 Dec 2022 05:27), Max: 98 (06 Dec 2022 21:44)  HR: 70 (07 Dec 2022 05:27) (65 - 72)  BP: 135/67 (07 Dec 2022 05:27) (97/54 - 135/67)  BP(mean): --  RR: 18 (07 Dec 2022 05:27) (16 - 18)  SpO2: 99% (07 Dec 2022 05:27) (99% - 100%)    Parameters below as of 07 Dec 2022 05:27  Patient On (Oxygen Delivery Method): room air        PHYSICAL EXAM  General: adult in NAD  HEENT: clear oropharynx, anicteric sclera, pink conjunctiva  Neck: supple  CV: normal S1/S2 with no murmur rubs or gallops  Lungs: positive air movement b/l ant lungs,clear to auscultation, no wheezes, no rales  Abdomen: soft non-tender non-distended, no hepatosplenomegaly  Ext: no clubbing cyanosis or edema  Skin: no rashes and no petechiae  Neuro: alert and oriented X 4, no focal deficits  LABS:                          9.9    16.89 )-----------( 348      ( 07 Dec 2022 05:30 )             32.8         Mean Cell Volume : 88.6 fl  Mean Cell Hemoglobin : 26.8 pg  Mean Cell Hemoglobin Concentration : 30.2 gm/dL  Auto Neutrophil # : x  Auto Lymphocyte # : x  Auto Monocyte # : x  Auto Eosinophil # : x  Auto Basophil # : x  Auto Neutrophil % : x  Auto Lymphocyte % : x  Auto Monocyte % : x  Auto Eosinophil % : x  Auto Basophil % : x    Serial CBC  Hematocrit 32.8  Hemoglobin 9.9  Plat 348  RBC 3.70  WBC 16.89  Serial CBC  Hematocrit 34.3  Hemoglobin 10.5  Plat 379  RBC 3.95  WBC 12.73  Serial CBC  Hematocrit --  Hemoglobin --  Plat --  RBC 4.18  WBC --    12-07    140  |  103  |  23<H>  ----------------------------<  94  4.0   |  26  |  0.99    Ca    11.4<H>      07 Dec 2022 05:30    TPro  7.5  /  Alb  2.1<L>  /  TBili  0.4  /  DBili  x   /  AST  32  /  ALT  27  /  AlkPhos  220<H>  12-07      PT/INR - ( 07 Dec 2022 05:30 )   PT: 14.3 sec;   INR: 1.20 ratio         PTT - ( 07 Dec 2022 05:30 )  PTT:27.3 sec    Reticulocyte Percent: 1.0 % (12-04 @ 12:36)  Iron - Total Binding Capacity.: 180 ug/dL (12-02 @ 05:45)  Ferritin, Serum: 809 ng/mL (12-02 @ 05:45)            BLOOD SMEAR INTERPRETATION:       RADIOLOGY & ADDITIONAL STUDIES:     Patient

## 2022-12-15 NOTE — H&P ADULT - NSHPSOURCEINFORD_GEN_ALL_CORE
Dear Mann Bunn    The team called you to schedule appointment, this E-visit is cancelled.      Thanks for choosing us as your health care partner,    Mami Szymanski MD   Patient

## 2023-01-19 NOTE — HISTORY OF PRESENT ILLNESS
Detail Level: Detailed
[FreeTextEntry1] : 61-year-old manhere for followup visit, history of epilepsy,prescribed Keppra  mg once a day.Last reported seizure, was in 2015. Denies any new events, no episode of confusion or disorientation, no periods of blacking out,no episode of waking up with  his tongue bitten or bed wet from urine.\par Denies any problems with the Keppra, no irritability, daytime fatigue and drowsiness.\par 
Detail Level: Zone

## 2023-07-05 ENCOUNTER — EMERGENCY (EMERGENCY)
Facility: HOSPITAL | Age: 63
LOS: 0 days | Discharge: HOME | End: 2023-07-06
Attending: EMERGENCY MEDICINE
Payer: COMMERCIAL

## 2023-07-05 VITALS — WEIGHT: 199.96 LBS | HEIGHT: 67 IN

## 2023-07-05 VITALS
OXYGEN SATURATION: 95 % | TEMPERATURE: 98 F | HEART RATE: 78 BPM | DIASTOLIC BLOOD PRESSURE: 76 MMHG | RESPIRATION RATE: 18 BRPM | SYSTOLIC BLOOD PRESSURE: 129 MMHG
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
## 2023-07-05 DIAGNOSIS — Z79.84 LONG TERM (CURRENT) USE OF ORAL HYPOGLYCEMIC DRUGS: ICD-10-CM

## 2023-07-05 DIAGNOSIS — I10 ESSENTIAL (PRIMARY) HYPERTENSION: ICD-10-CM

## 2023-07-05 DIAGNOSIS — E11.9 TYPE 2 DIABETES MELLITUS WITHOUT COMPLICATIONS: ICD-10-CM

## 2023-07-05 DIAGNOSIS — R07.9 CHEST PAIN, UNSPECIFIED: ICD-10-CM
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
## 2023-07-05 DIAGNOSIS — T14.8XXA OTHER INJURY OF UNSPECIFIED BODY REGION, INITIAL ENCOUNTER: ICD-10-CM

## 2023-07-05 DIAGNOSIS — Z91.041 RADIOGRAPHIC DYE ALLERGY STATUS: ICD-10-CM

## 2023-07-05 DIAGNOSIS — M25.511 PAIN IN RIGHT SHOULDER: ICD-10-CM

## 2023-07-05 DIAGNOSIS — Z41.9 ENCOUNTER FOR PROCEDURE FOR PURPOSES OTHER THAN REMEDYING HEALTH STATE, UNSPECIFIED: Chronic | ICD-10-CM

## 2023-07-05 DIAGNOSIS — M54.2 CERVICALGIA: ICD-10-CM

## 2023-07-05 DIAGNOSIS — E78.5 HYPERLIPIDEMIA, UNSPECIFIED: ICD-10-CM

## 2023-07-05 DIAGNOSIS — R51.9 HEADACHE, UNSPECIFIED: ICD-10-CM

## 2023-07-05 PROCEDURE — 74176 CT ABD & PELVIS W/O CONTRAST: CPT | Mod: MA

## 2023-07-05 PROCEDURE — 72125 CT NECK SPINE W/O DYE: CPT | Mod: 26,MA

## 2023-07-05 PROCEDURE — 99285 EMERGENCY DEPT VISIT HI MDM: CPT

## 2023-07-05 PROCEDURE — 72125 CT NECK SPINE W/O DYE: CPT | Mod: MA

## 2023-07-05 PROCEDURE — 74176 CT ABD & PELVIS W/O CONTRAST: CPT | Mod: 26,MA

## 2023-07-05 PROCEDURE — 93005 ELECTROCARDIOGRAM TRACING: CPT

## 2023-07-05 PROCEDURE — 70450 CT HEAD/BRAIN W/O DYE: CPT | Mod: MA

## 2023-07-05 PROCEDURE — 93010 ELECTROCARDIOGRAM REPORT: CPT

## 2023-07-05 PROCEDURE — 99284 EMERGENCY DEPT VISIT MOD MDM: CPT | Mod: 25

## 2023-07-05 PROCEDURE — 71250 CT THORAX DX C-: CPT | Mod: 26,MA

## 2023-07-05 PROCEDURE — 73030 X-RAY EXAM OF SHOULDER: CPT | Mod: RT

## 2023-07-05 PROCEDURE — 73030 X-RAY EXAM OF SHOULDER: CPT | Mod: 26,RT

## 2023-07-05 PROCEDURE — 70450 CT HEAD/BRAIN W/O DYE: CPT | Mod: 26,MA

## 2023-07-05 PROCEDURE — 71250 CT THORAX DX C-: CPT | Mod: MA

## 2023-07-05 RX ORDER — CYCLOBENZAPRINE HYDROCHLORIDE 10 MG/1
10 TABLET, FILM COATED ORAL ONCE
Refills: 0 | Status: COMPLETED | OUTPATIENT
Start: 2023-07-05 | End: 2023-07-05

## 2023-07-05 RX ORDER — ACETAMINOPHEN 500 MG
1000 TABLET ORAL ONCE
Refills: 0 | Status: DISCONTINUED | OUTPATIENT
Start: 2023-07-05 | End: 2023-07-05

## 2023-07-05 RX ORDER — ACETAMINOPHEN 500 MG
1000 TABLET ORAL ONCE
Refills: 0 | Status: COMPLETED | OUTPATIENT
Start: 2023-07-05 | End: 2023-07-05

## 2023-07-05 RX ADMIN — Medication 1000 MILLIGRAM(S): at 23:58

## 2023-07-05 RX ADMIN — CYCLOBENZAPRINE HYDROCHLORIDE 10 MILLIGRAM(S): 10 TABLET, FILM COATED ORAL at 23:58

## 2023-07-05 NOTE — ED STATDOCS - MUSCULOSKELETAL, MLM
range of motion is not limited ; no midline spinal tenderness; +paraspinal cervical muscle tenderness; +right trapezius muscle tenderness
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
## 2023-07-05 NOTE — ED STATDOCS - CCCP TRG CHIEF CMPLNT
Admission database completed with patient. Pt alert and oriented x4. Pt not sure of all medication he takes.  Will discuss with wife when she comes in tomorrow to update PTA medication list.  
 chest pain
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
## 2023-07-05 NOTE — ED STATDOCS - PATIENT PORTAL LINK FT
You can access the FollowMyHealth Patient Portal offered by [facility] by registering at the following website: [email or web address]. By joining [employer]’s FollowMyHealth portal, you will also be able to view your health information using other applications (apps) compatible with our system.
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
## 2023-07-06 RX ORDER — ACETAMINOPHEN 500 MG
650 TABLET ORAL ONCE
Refills: 0 | Status: COMPLETED | OUTPATIENT
Start: 2023-07-06 | End: 2023-07-06

## 2023-07-06 RX ADMIN — Medication 650 MILLIGRAM(S): at 01:31

## 2023-07-09 ENCOUNTER — EMERGENCY (EMERGENCY)
Facility: HOSPITAL | Age: 63
LOS: 0 days | Discharge: HOME | End: 2023-07-09
Attending: STUDENT IN AN ORGANIZED HEALTH CARE EDUCATION/TRAINING PROGRAM
Payer: COMMERCIAL

## 2023-07-09 VITALS — WEIGHT: 199.96 LBS | HEIGHT: 67 IN

## 2023-07-09 VITALS
OXYGEN SATURATION: 100 % | TEMPERATURE: 98 F | DIASTOLIC BLOOD PRESSURE: 77 MMHG | SYSTOLIC BLOOD PRESSURE: 126 MMHG | RESPIRATION RATE: 18 BRPM | HEART RATE: 80 BPM

## 2023-07-09 DIAGNOSIS — F41.9 ANXIETY DISORDER, UNSPECIFIED: ICD-10-CM

## 2023-07-09 DIAGNOSIS — Z41.9 ENCOUNTER FOR PROCEDURE FOR PURPOSES OTHER THAN REMEDYING HEALTH STATE, UNSPECIFIED: Chronic | ICD-10-CM

## 2023-07-09 DIAGNOSIS — S16.1XXA STRAIN OF MUSCLE, FASCIA AND TENDON AT NECK LEVEL, INITIAL ENCOUNTER: ICD-10-CM

## 2023-07-09 DIAGNOSIS — M25.511 PAIN IN RIGHT SHOULDER: ICD-10-CM

## 2023-07-09 DIAGNOSIS — Z86.69 PERSONAL HISTORY OF OTHER DISEASES OF THE NERVOUS SYSTEM AND SENSE ORGANS: ICD-10-CM
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
## 2023-07-09 DIAGNOSIS — M25.512 PAIN IN LEFT SHOULDER: ICD-10-CM

## 2023-07-09 DIAGNOSIS — M54.2 CERVICALGIA: ICD-10-CM

## 2023-07-09 DIAGNOSIS — Z79.84 LONG TERM (CURRENT) USE OF ORAL HYPOGLYCEMIC DRUGS: ICD-10-CM

## 2023-07-09 DIAGNOSIS — M17.0 BILATERAL PRIMARY OSTEOARTHRITIS OF KNEE: ICD-10-CM

## 2023-07-09 DIAGNOSIS — Z91.041 RADIOGRAPHIC DYE ALLERGY STATUS: ICD-10-CM

## 2023-07-09 DIAGNOSIS — E11.9 TYPE 2 DIABETES MELLITUS WITHOUT COMPLICATIONS: ICD-10-CM

## 2023-07-09 PROCEDURE — 99284 EMERGENCY DEPT VISIT MOD MDM: CPT | Mod: 25

## 2023-07-09 PROCEDURE — 99284 EMERGENCY DEPT VISIT MOD MDM: CPT

## 2023-07-09 PROCEDURE — 72040 X-RAY EXAM NECK SPINE 2-3 VW: CPT | Mod: 26

## 2023-07-09 PROCEDURE — 72040 X-RAY EXAM NECK SPINE 2-3 VW: CPT

## 2023-07-09 PROCEDURE — 73030 X-RAY EXAM OF SHOULDER: CPT | Mod: 50

## 2023-07-09 PROCEDURE — 73030 X-RAY EXAM OF SHOULDER: CPT | Mod: 26,LT,RT

## 2023-07-09 RX ORDER — IBUPROFEN 200 MG
600 TABLET ORAL ONCE
Refills: 0 | Status: COMPLETED | OUTPATIENT
Start: 2023-07-09 | End: 2023-07-09

## 2023-07-09 RX ORDER — ACETAMINOPHEN 500 MG
650 TABLET ORAL ONCE
Refills: 0 | Status: COMPLETED | OUTPATIENT
Start: 2023-07-09 | End: 2023-07-09

## 2023-07-09 RX ORDER — CYCLOBENZAPRINE HYDROCHLORIDE 10 MG/1
10 TABLET, FILM COATED ORAL ONCE
Refills: 0 | Status: COMPLETED | OUTPATIENT
Start: 2023-07-09 | End: 2023-07-09

## 2023-07-09 RX ORDER — CYCLOBENZAPRINE HYDROCHLORIDE 10 MG/1
1 TABLET, FILM COATED ORAL
Qty: 15 | Refills: 0
Start: 2023-07-09

## 2023-07-09 RX ADMIN — CYCLOBENZAPRINE HYDROCHLORIDE 10 MILLIGRAM(S): 10 TABLET, FILM COATED ORAL at 16:47

## 2023-07-09 RX ADMIN — Medication 600 MILLIGRAM(S): at 16:47

## 2023-07-09 RX ADMIN — Medication 650 MILLIGRAM(S): at 16:47

## 2023-07-09 NOTE — ED STATDOCS - NS_ ATTENDINGSCRIBEDETAILS _ED_A_ED_FT
The scribe's documentation has been prepared under my direction and personally reviewed by me in its entirety. I confirm that the note above accurately reflects all work, treatment, procedures, and medical decision making performed by me.  JOSE JUAN Restrepo-MS, MD  Internal/Emergency/Critical Care Medicine

## 2023-07-09 NOTE — ED STATDOCS - NS ED ATTENDING STATEMENT MOD
This was a shared visit with the POP. I reviewed and verified the documentation and independently performed the documented:
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
## 2023-07-09 NOTE — ED STATDOCS - ATTENDING APP SHARED VISIT CONTRIBUTION OF CARE
I personally saw the patient with the PA, and completed the key components of the history and physical exam. I then discussed the management plan with the PA.  JOSE JUAN Restrepo-MS, MD  Internal/Emergency/Critical Care Medicine

## 2023-07-09 NOTE — ED ADULT NURSE NOTE - NSFALLUNIVINTERV_ED_ALL_ED
Bed/Stretcher in lowest position, wheels locked, appropriate side rails in place/Call bell, personal items and telephone in reach/Instruct patient to call for assistance before getting out of bed/chair/stretcher/Non-slip footwear applied when patient is off stretcher/[place] to call system/Physically safe environment - no spills, clutter or unnecessary equipment/Purposeful proactive rounding/Room/bathroom lighting operational, light cord in reach
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
## 2023-07-09 NOTE — ED ADULT TRIAGE NOTE - CHIEF COMPLAINT QUOTE
Pt presents to the ED c/o neck and left shoulder pain x1 day. Denies injury. Pt took Tylenol at 7pm without relief.

## 2023-07-09 NOTE — ED STATDOCS - PATIENT PORTAL LINK FT
You can access the FollowMyHealth Patient Portal offered by [facility] by registering at the following website: [email or web address]. By joining [employer]’s FollowMyHealth portal, you will also be able to view your health information using other applications (apps) compatible with our system.

## 2023-07-09 NOTE — ED STATDOCS - NSICDXPASTMEDICALHX_GEN_ALL_CORE_FT
PAST MEDICAL HISTORY:  Arthritis B/L knee pain in cold weather    Diabetes     Seizures Last seizure was over 9 years ago      Anxiety

## 2023-07-09 NOTE — ED STATDOCS - PHYSICAL EXAMINATION
PHYSICAL EXAM:  GENERAL: in NAD, Sitting comfortable in bed, in no respiratory distress  HEAD: Atraumatic, no valle's sign, no periorbital ecchymosis   EYES: PERRL, EOMs intact b/l w/out deficits  ENMT: Moist membranes, no anterior/posterior, or supraclavicular LAD  CHEST/LUNG: CTAB no wheezes/rhonchi/rales  HEART: RRR no murmur/gallops/rubs  ABDOMEN: +BS, soft, NT, ND  EXTREMITIES: No LE edema, +2 radial pulses b/l  NERVOUS SYSTEM:  A&Ox4, No motor deficits or sensory deficits to b/l UEs  Heme/LYMPH: No ecchymosis or bruising or LAD PHYSICAL EXAM:  GENERAL: in NAD, Sitting comfortable in bed, in no respiratory distress  HEAD: Atraumatic, no valle's sign, no periorbital ecchymosis   EYES: PERRL, EOMs intact b/l w/out deficits  ENMT: Moist membranes, no anterior/posterior, or supraclavicular LAD  Back with no midline tenderness no stepoffs bilateral paraspinal cervical tenderness and spasm noted  CHEST/LUNG: CTAB no wheezes/rhonchi/rales  HEART: RRR no murmur/gallops/rubs  ABDOMEN: +BS, soft, NT, ND  EXTREMITIES: No LE edema, +2 radial pulses b/l  NERVOUS SYSTEM:  A&Ox4, No motor deficits or sensory deficits to b/l UEs  Heme/LYMPH: No ecchymosis or bruising or LAD

## 2023-07-14 NOTE — ED STATDOCS - PROGRESS NOTE DETAILS
,DirectAddress_Unknown 89 yr. 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 No numbness or tingling. Seen and examined by attending in Intake. Plan: IV, Ancef  IV, X-Ray  and Hand surgeon. Will F/U with results and re evaluate. Janell HAMILTON 89 yr. 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 No numbness or tingling. Seen and examined by attending in Intake. Plan: IV, Ancef  IV, X-Ray  Tdap and Hand surgeon. Will F/U with results and re evaluate. Janell HAMILTON Seen and treated by Orthopedic Resident. Will F/U with Dr. Hurst and RxKaylin Self.  Janell Seen and treated by Orthopedic Resident. Will F/U with Dr. Hurst and Rx. Clair.  Janell HAMILTON

## 2023-09-20 NOTE — ED STATDOCS - CHIEF COMPLAINT
Type Of Previous Surgery (Optional- Ie Mohs Surgery): Mohs The patient is a 57y year old Male complaining of burning/frequent urination.

## 2024-02-09 ENCOUNTER — APPOINTMENT (OUTPATIENT)
Dept: NEUROLOGY | Facility: CLINIC | Age: 64
End: 2024-02-09
Payer: COMMERCIAL

## 2024-02-09 VITALS
DIASTOLIC BLOOD PRESSURE: 75 MMHG | SYSTOLIC BLOOD PRESSURE: 129 MMHG | TEMPERATURE: 97.6 F | WEIGHT: 270 LBS | HEART RATE: 93 BPM | HEIGHT: 67 IN | BODY MASS INDEX: 42.38 KG/M2

## 2024-02-09 DIAGNOSIS — F51.02 ADJUSTMENT INSOMNIA: ICD-10-CM

## 2024-02-09 PROCEDURE — G2211 COMPLEX E/M VISIT ADD ON: CPT

## 2024-02-09 PROCEDURE — 99204 OFFICE O/P NEW MOD 45 MIN: CPT

## 2024-02-09 RX ORDER — TRAZODONE HYDROCHLORIDE 50 MG/1
50 TABLET ORAL AT BEDTIME
Qty: 30 | Refills: 3 | Status: ACTIVE | COMMUNITY
Start: 2024-02-09 | End: 1900-01-01

## 2024-02-09 RX ORDER — LEVETIRACETAM 750 MG/1
750 TABLET, EXTENDED RELEASE ORAL
Qty: 90 | Refills: 2 | Status: ACTIVE | COMMUNITY
Start: 2018-12-13 | End: 1900-01-01
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
## 2024-02-09 NOTE — PHYSICAL EXAM
[General Appearance - Alert] : alert [General Appearance - In No Acute Distress] : in no acute distress [Oriented To Time, Place, And Person] : oriented to person, place, and time [Impaired Insight] : insight and judgment were intact [Affect] : the affect was normal [Person] : oriented to person [Place] : oriented to place [Time] : oriented to time [Concentration Intact] : normal concentrating ability [Fluency] : fluency intact [Comprehension] : comprehension intact [Cranial Nerves Optic (II)] : visual acuity intact bilaterally,  visual fields full to confrontation, pupils equal round and reactive to light [Cranial Nerves Oculomotor (III)] : extraocular motion intact [Cranial Nerves Trigeminal (V)] : facial sensation intact symmetrically [Cranial Nerves Facial (VII)] : face symmetrical [Cranial Nerves Vestibulocochlear (VIII)] : hearing was intact bilaterally [Cranial Nerves Glossopharyngeal (IX)] : tongue and palate midline [Cranial Nerves Accessory (XI - Cranial And Spinal)] : head turning and shoulder shrug symmetric [Cranial Nerves Hypoglossal (XII)] : there was no tongue deviation with protrusion [Motor Tone] : muscle tone was normal in all four extremities [Motor Strength] : muscle strength was normal in all four extremities [No Muscle Atrophy] : normal bulk in all four extremities [Motor Handedness Right-Handed] : the patient is right hand dominant [Sensation Tactile Decrease] : light touch was intact [Abnormal Walk] : normal gait [Balance] : balance was intact [Paresis Pronator Drift Right-Sided] : no pronator drift on the right [Paresis Pronator Drift Left-Sided] : no pronator drift on the left [Past-pointing] : there was no past-pointing [Tremor] : no tremor present [Dysdiadochokinesia Bilaterally] : not present [Outer Ear] : the ears and nose were normal in appearance [Hearing Threshold Finger Rub Not Meigs] : hearing was normal [Neck Appearance] : the appearance of the neck was normal [] : the neck was supple [Neck Cervical Mass (___cm)] : no neck mass was observed [Respiration, Rhythm And Depth] : normal respiratory rhythm and effort [Exaggerated Use Of Accessory Muscles For Inspiration] : no accessory muscle use [Auscultation Breath Sounds / Voice Sounds] : lungs were clear to auscultation bilaterally [Chest Palpation] : palpation of the chest revealed no abnormalities [Heart Rate And Rhythm] : heart rate was normal and rhythm regular [Heart Sounds] : normal S1 and S2 [Heart Sounds Gallop] : no gallops [Murmurs] : no murmurs [Arterial Pulses Carotid] : carotid pulses were normal with no bruits [Edema] : there was no peripheral edema [No Spinal Tenderness] : no spinal tenderness

## 2024-02-09 NOTE — DISCUSSION/SUMMARY
[FreeTextEntry1] : 63-year-old man with a history of seizure disorder, generalized, no reported events since 2015.  The last available EEG was in 2021 which was a standard awake and drowsy normal EEG.  Last serum levetiracetam level also around the same time, was low at 7.9. Occasional difficulty falling asleep.  Discussed options. Plan: Will continue on levetiracetam  mg p.o. nightly. Trazodone 50 mg, 1 tablet as needed for sleep at night. Will order serum levetiracetam levels, comprehensive metabolic panel, CBC. Obtain a 24-hour ambulatory EEG to evaluate underlying ictal phenomena subclinical.  [Well-controlled] : well-controlled [Generalized] : generalized  [Idiopathic] : idiopathic  [Risks Associated with Driving/[place] Law] : As per my usual protocol, the patient was advised in regards to risks and driving privileges associated with the [place] Guidelines.  [Safety Recommendations] : The patient was advised in regards to the risk of seizures and general seizure safety recommendations including not to be bathing alone, climbing to high places and operating heavy machinery. [Compliance with Medications] : The importance of compliance with medications was reinforced. [Medication Side Effects] : High frequency and serious potential medication adverse effects were reviewed with the patient, including but not exclusive to psychiatric effects.  Information sheets on medication side effects were made available to the patient in our clinic.  The patient or advocate agrees to notify us for any concerns. [Sleep Hygiene/Sleep Disruption Risks] : Sleep hygiene and the risks of sleep disruption were discussed. [Risk of Death] : Risk of death associated with seizures / SUDEP was discussed. [Evaluation for interictal epileptiform activity, subclinical seizures, and risk stratification (typically 2-3 days)] : Evaluation for interictal epileptiform activity, subclinical seizures, and risk stratification (typically 2-3 days) [de-identified] : 24-hour ambulatory EEG
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
## 2024-02-15 ENCOUNTER — APPOINTMENT (OUTPATIENT)
Dept: NEUROLOGY | Facility: CLINIC | Age: 64
End: 2024-02-15
Payer: COMMERCIAL

## 2024-02-15 PROCEDURE — 95819 EEG AWAKE AND ASLEEP: CPT

## 2024-02-16 ENCOUNTER — NON-APPOINTMENT (OUTPATIENT)
Age: 64
End: 2024-02-16

## 2024-03-29 NOTE — ED ADULT NURSE NOTE - DISCHARGE DATE/TIME
Addended by: DOMINICK QUIROZ on: 3/29/2024 11:38 AM     Modules accepted: Orders     08-Jul-2017 17:43

## 2024-06-19 RX ORDER — SITAGLIPTIN AND METFORMIN HYDROCHLORIDE 500; 50 MG/1; MG/1
1 TABLET, FILM COATED ORAL
Qty: 0 | Refills: 0 | DISCHARGE

## 2024-06-19 RX ORDER — LEVETIRACETAM 250 MG/1
1 TABLET, FILM COATED ORAL
Qty: 0 | Refills: 0 | DISCHARGE

## 2024-07-09 ENCOUNTER — APPOINTMENT (OUTPATIENT)
Dept: CARDIOLOGY | Facility: CLINIC | Age: 64
End: 2024-07-09
Payer: MEDICAID

## 2024-07-09 ENCOUNTER — NON-APPOINTMENT (OUTPATIENT)
Age: 64
End: 2024-07-09

## 2024-07-09 VITALS
OXYGEN SATURATION: 97 % | BODY MASS INDEX: 32.8 KG/M2 | WEIGHT: 209 LBS | SYSTOLIC BLOOD PRESSURE: 126 MMHG | HEIGHT: 67 IN | DIASTOLIC BLOOD PRESSURE: 77 MMHG | HEART RATE: 79 BPM

## 2024-07-09 DIAGNOSIS — R07.89 OTHER CHEST PAIN: ICD-10-CM

## 2024-07-09 DIAGNOSIS — I10 ESSENTIAL (PRIMARY) HYPERTENSION: ICD-10-CM

## 2024-07-09 DIAGNOSIS — E78.5 HYPERLIPIDEMIA, UNSPECIFIED: ICD-10-CM

## 2024-07-09 PROCEDURE — 99204 OFFICE O/P NEW MOD 45 MIN: CPT | Mod: 25

## 2024-07-09 PROCEDURE — G2211 COMPLEX E/M VISIT ADD ON: CPT | Mod: NC

## 2024-07-09 PROCEDURE — 93000 ELECTROCARDIOGRAM COMPLETE: CPT

## 2024-07-12 ENCOUNTER — NON-APPOINTMENT (OUTPATIENT)
Age: 64
End: 2024-07-12

## 2024-07-15 ENCOUNTER — APPOINTMENT (OUTPATIENT)
Dept: CARDIOLOGY | Facility: CLINIC | Age: 64
End: 2024-07-15

## 2024-07-15 PROCEDURE — 78452 HT MUSCLE IMAGE SPECT MULT: CPT

## 2024-07-15 PROCEDURE — A9500: CPT

## 2024-07-15 PROCEDURE — 93306 TTE W/DOPPLER COMPLETE: CPT

## 2024-07-15 PROCEDURE — 93015 CV STRESS TEST SUPVJ I&R: CPT

## 2024-07-26 ENCOUNTER — NON-APPOINTMENT (OUTPATIENT)
Age: 64
End: 2024-07-26

## 2024-08-05 NOTE — ED ADULT NURSE NOTE - CAS DISCH CONDITION
[No] : No [No falls in past year] : Patient reported no falls in the past year [0] : 2) Feeling down, depressed, or hopeless: Not at all (0) [[id number]] : 0 Stable

## 2024-08-07 ENCOUNTER — APPOINTMENT (OUTPATIENT)
Dept: NEUROLOGY | Facility: CLINIC | Age: 64
End: 2024-08-07

## 2024-08-07 PROCEDURE — 99203 OFFICE O/P NEW LOW 30 MIN: CPT
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
## 2024-08-07 NOTE — PHYSICAL EXAM
[General Appearance - Alert] : alert [General Appearance - In No Acute Distress] : in no acute distress [General Appearance - Well Nourished] : well nourished [General Appearance - Well Developed] : well developed [General Appearance - Well-Appearing] : healthy appearing [] : normal voice and communication [Oriented To Time, Place, And Person] : oriented to person, place, and time [Impaired Insight] : insight and judgment were intact [Affect] : the affect was normal [Mood] : the mood was normal [Memory Recent] : recent memory was not impaired [Memory Remote] : remote memory was not impaired [Person] : oriented to person [Place] : oriented to place [Time] : oriented to time [Cranial Nerves Optic (II)] : visual acuity intact bilaterally,  visual fields full to confrontation, pupils equal round and reactive to light [Cranial Nerves Oculomotor (III)] : extraocular motion intact [Cranial Nerves Trigeminal (V)] : facial sensation intact symmetrically [Cranial Nerves Facial (VII)] : face symmetrical [Cranial Nerves Vestibulocochlear (VIII)] : hearing was intact bilaterally [Cranial Nerves Glossopharyngeal (IX)] : tongue and palate midline [Cranial Nerves Accessory (XI - Cranial And Spinal)] : head turning and shoulder shrug symmetric [Cranial Nerves Hypoglossal (XII)] : there was no tongue deviation with protrusion [Motor Tone] : muscle tone was normal in all four extremities [Motor Strength] : muscle strength was normal in all four extremities [No Muscle Atrophy] : normal bulk in all four extremities [Motor Handedness Right-Handed] : the patient is right hand dominant [Sensation Tactile Decrease] : light touch was intact [Abnormal Walk] : normal gait [Balance] : balance was intact [Past-pointing] : there was no past-pointing [Tremor] : no tremor present [Dysdiadochokinesia Bilaterally] : not present

## 2024-08-07 NOTE — HISTORY OF PRESENT ILLNESS
[FreeTextEntry1] : 64-year-old man with a history of diabetes, hypertension, seizure disorder, here for follow-up visit.  Last time seen in February of this year.  History of secondary generalized seizure disorder. History of chronic back pain and evaluated for back surgery.   No reported recent seizures, no blackouts.  No episode of disorientation.  Prescribed levetiracetam  mg once a day.  Last visit order serum levels repeated, but patient never went for his blood being drawn, his previous level of 10 August 2019 was subtherapeutic at 9.1.  He denies noncompliance, he usually takes his medications before bedtime. Prior history of alcohol use, which is a benign staining now for several years.  His last reported seizure, was about 15 years ago.. Most recent EEG was abnormal right hemispheric intermittent epileptiform discharges.   Previous imaging, MRI of brain 2015 showed chronic changes right frontal lobe.  Repeated CT scan of the head and neck showed no significant findings.

## 2024-08-07 NOTE — REVIEW OF SYSTEMS
[Seizures] : convulsions [As Noted in HPI] : as noted in HPI [Negative] : Endocrine [FreeTextEntry9] : Chronic back pain

## 2024-08-07 NOTE — DISCUSSION/SUMMARY
[FreeTextEntry1] : 64-year-old man with a history of prior alcoholism, partial, secondary generalized seizures.  Has been clinically seizure-free now for least 15 years.  On levetiracetam  mg daily.  Last visit a 24-hour ambulatory EEG as well as a serum levetiracetam was ordered but was not obtained.  Regular EEG showed a right frontal intermittent epileptiform discharges. Will repeat 24-hour ambulatory EEG for event capture, subclinical events. Advised him to obtain a serum levetiracetam level. For now continue levetiracetam  mg p.o. nightly. Return to the office, 6 months. [Well-controlled] : well-controlled [Complex Partial] : complex partial [Focal] : focal [Risks Associated with Driving/[place] Law] : As per my usual protocol, the patient was advised in regards to risks and driving privileges associated with the [place] Guidelines.  [Safety Recommendations] : The patient was advised in regards to the risk of seizures and general seizure safety recommendations including not to be bathing alone, climbing to high places and operating heavy machinery. [Compliance with Medications] : The importance of compliance with medications was reinforced. [Medication Side Effects] : High frequency and serious potential medication adverse effects were reviewed with the patient, including but not exclusive to psychiatric effects.  Information sheets on medication side effects were made available to the patient in our clinic.  The patient or advocate agrees to notify us for any concerns. [Sleep Hygiene/Sleep Disruption Risks] : Sleep hygiene and the risks of sleep disruption were discussed. [Risk of Death] : Risk of death associated with seizures / SUDEP was discussed. [de-identified] : 24-hour ambulatory EEG for event capture interictal activity.

## 2024-08-14 ENCOUNTER — NON-APPOINTMENT (OUTPATIENT)
Age: 64
End: 2024-08-14

## 2024-10-15 ENCOUNTER — APPOINTMENT (OUTPATIENT)
Dept: NEUROLOGY | Facility: CLINIC | Age: 64
End: 2024-10-15
Payer: MEDICAID

## 2024-10-15 PROCEDURE — 95819 EEG AWAKE AND ASLEEP: CPT

## 2024-10-16 ENCOUNTER — APPOINTMENT (OUTPATIENT)
Dept: NEUROLOGY | Facility: CLINIC | Age: 64
End: 2024-10-16
Payer: MEDICAID

## 2024-10-16 PROCEDURE — 95708 EEG WO VID EA 12-26HR UNMNTR: CPT

## 2024-10-16 PROCEDURE — 95719 EEG PHYS/QHP EA INCR W/O VID: CPT

## 2024-10-16 PROCEDURE — 95700 EEG CONT REC W/VID EEG TECH: CPT

## 2025-05-01 ENCOUNTER — APPOINTMENT (OUTPATIENT)
Dept: UROLOGY | Facility: CLINIC | Age: 65
End: 2025-05-01
Payer: MEDICAID

## 2025-05-01 VITALS
DIASTOLIC BLOOD PRESSURE: 78 MMHG | OXYGEN SATURATION: 96 % | RESPIRATION RATE: 14 BRPM | HEIGHT: 67 IN | TEMPERATURE: 97.8 F | HEART RATE: 101 BPM | WEIGHT: 207 LBS | BODY MASS INDEX: 32.49 KG/M2 | SYSTOLIC BLOOD PRESSURE: 138 MMHG

## 2025-05-01 DIAGNOSIS — N40.0 BENIGN PROSTATIC HYPERPLASIA WITHOUT LOWER URINARY TRACT SYMPMS: ICD-10-CM

## 2025-05-01 DIAGNOSIS — N52.9 MALE ERECTILE DYSFUNCTION, UNSPECIFIED: ICD-10-CM

## 2025-05-01 DIAGNOSIS — R68.82 DECREASED LIBIDO: ICD-10-CM

## 2025-05-01 PROCEDURE — 99204 OFFICE O/P NEW MOD 45 MIN: CPT

## 2025-05-02 ENCOUNTER — NON-APPOINTMENT (OUTPATIENT)
Age: 65
End: 2025-05-02

## 2025-05-03 PROBLEM — R81 GLYCOSURIA: Status: ACTIVE | Noted: 2025-05-03

## 2025-05-03 LAB
APPEARANCE: ABNORMAL
BACTERIA: NEGATIVE /HPF
BILIRUBIN URINE: NEGATIVE
BLOOD URINE: NEGATIVE
CAST: 2 /LPF
COLOR: NORMAL
EPITHELIAL CELLS: 1 /HPF
GLUCOSE QUALITATIVE U: >=1000 MG/DL
KETONES URINE: ABNORMAL MG/DL
LEUKOCYTE ESTERASE URINE: NEGATIVE
MICROSCOPIC-UA: NORMAL
NITRITE URINE: NEGATIVE
PH URINE: 5.5
PROTEIN URINE: 300 MG/DL
PSA SERPL-MCNC: 1.17 NG/ML
RED BLOOD CELLS URINE: 1 /HPF
SPECIFIC GRAVITY URINE: >1.03
TESTOST SERPL-MCNC: 385 NG/DL
UROBILINOGEN URINE: 0.2 MG/DL
WHITE BLOOD CELLS URINE: 0 /HPF

## 2025-05-05 LAB — URINE CYTOLOGY: NORMAL

## 2025-05-07 ENCOUNTER — APPOINTMENT (OUTPATIENT)
Dept: NEUROLOGY | Facility: CLINIC | Age: 65
End: 2025-05-07

## 2025-05-07 DIAGNOSIS — R81 GLYCOSURIA: ICD-10-CM

## 2025-06-23 ENCOUNTER — APPOINTMENT (OUTPATIENT)
Dept: UROLOGY | Facility: CLINIC | Age: 65
End: 2025-06-23
Payer: MEDICAID

## 2025-06-23 VITALS
WEIGHT: 207 LBS | OXYGEN SATURATION: 93 % | BODY MASS INDEX: 32.49 KG/M2 | HEIGHT: 67 IN | HEART RATE: 98 BPM | TEMPERATURE: 97.7 F | RESPIRATION RATE: 15 BRPM | DIASTOLIC BLOOD PRESSURE: 69 MMHG | SYSTOLIC BLOOD PRESSURE: 113 MMHG

## 2025-06-23 PROCEDURE — 54235 NJX CORPORA CAVERNOSA RX AGT: CPT

## 2025-06-23 PROCEDURE — 93980 PENILE VASCULAR STUDY: CPT

## 2025-06-23 RX ORDER — TADALAFIL 5 MG/1
5 TABLET ORAL
Qty: 10 | Refills: 11 | Status: ACTIVE | COMMUNITY
Start: 2025-06-23 | End: 1900-01-01

## 2025-07-21 ENCOUNTER — APPOINTMENT (OUTPATIENT)
Dept: UROLOGY | Facility: CLINIC | Age: 65
End: 2025-07-21
Payer: MEDICAID

## 2025-07-21 DIAGNOSIS — N52.9 MALE ERECTILE DYSFUNCTION, UNSPECIFIED: ICD-10-CM

## 2025-07-21 PROCEDURE — 99213 OFFICE O/P EST LOW 20 MIN: CPT
